# Patient Record
Sex: FEMALE | Race: WHITE | Employment: OTHER | URBAN - METROPOLITAN AREA
[De-identification: names, ages, dates, MRNs, and addresses within clinical notes are randomized per-mention and may not be internally consistent; named-entity substitution may affect disease eponyms.]

---

## 2019-12-07 ENCOUNTER — APPOINTMENT (OUTPATIENT)
Dept: GENERAL RADIOLOGY | Facility: CLINIC | Age: 69
DRG: 494 | End: 2019-12-07
Attending: NURSE PRACTITIONER
Payer: COMMERCIAL

## 2019-12-07 ENCOUNTER — TRANSFERRED RECORDS (OUTPATIENT)
Dept: HEALTH INFORMATION MANAGEMENT | Facility: CLINIC | Age: 69
End: 2019-12-07

## 2019-12-07 ENCOUNTER — HOSPITAL ENCOUNTER (INPATIENT)
Facility: CLINIC | Age: 69
LOS: 2 days | Discharge: NURSING, REHAB OR LONG-TERM CARE | DRG: 494 | End: 2019-12-11
Attending: NURSE PRACTITIONER | Admitting: INTERNAL MEDICINE
Payer: COMMERCIAL
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
## 2019-12-07 DIAGNOSIS — S82.891A CLOSED FRACTURE OF RIGHT ANKLE, INITIAL ENCOUNTER: ICD-10-CM

## 2019-12-07 PROBLEM — S82.899A ANKLE FRACTURE: Status: ACTIVE | Noted: 2019-12-07

## 2019-12-07 PROCEDURE — 99220 ZZC INITIAL OBSERVATION CARE,LEVL III: CPT | Performed by: INTERNAL MEDICINE

## 2019-12-07 PROCEDURE — 0QSJXZZ REPOSITION RIGHT FIBULA, EXTERNAL APPROACH: ICD-10-PCS | Performed by: NURSE PRACTITIONER

## 2019-12-07 PROCEDURE — 96374 THER/PROPH/DIAG INJ IV PUSH: CPT

## 2019-12-07 PROCEDURE — 40000986 XR ANKLE RT G/E 3 VW: Mod: RT

## 2019-12-07 PROCEDURE — 25000128 H RX IP 250 OP 636: Performed by: NURSE PRACTITIONER

## 2019-12-07 PROCEDURE — 27810 TREATMENT OF ANKLE FRACTURE: CPT | Mod: RT

## 2019-12-07 PROCEDURE — 0QSGXZZ REPOSITION RIGHT TIBIA, EXTERNAL APPROACH: ICD-10-PCS | Performed by: NURSE PRACTITIONER

## 2019-12-07 PROCEDURE — 25000128 H RX IP 250 OP 636

## 2019-12-07 PROCEDURE — 99285 EMERGENCY DEPT VISIT HI MDM: CPT | Mod: 25

## 2019-12-07 PROCEDURE — 25000132 ZZH RX MED GY IP 250 OP 250 PS 637: Performed by: INTERNAL MEDICINE

## 2019-12-07 PROCEDURE — G0378 HOSPITAL OBSERVATION PER HR: HCPCS

## 2019-12-07 PROCEDURE — 25000132 ZZH RX MED GY IP 250 OP 250 PS 637: Performed by: NURSE PRACTITIONER

## 2019-12-07 RX ORDER — HYDROMORPHONE HYDROCHLORIDE 1 MG/ML
INJECTION, SOLUTION INTRAMUSCULAR; INTRAVENOUS; SUBCUTANEOUS
Status: COMPLETED
Start: 2019-12-07 | End: 2019-12-07

## 2019-12-07 RX ORDER — ASPIRIN 81 MG/1
81 TABLET ORAL DAILY
COMMUNITY

## 2019-12-07 RX ORDER — POLYETHYLENE GLYCOL 3350 17 G/17G
17 POWDER, FOR SOLUTION ORAL DAILY PRN
Status: DISCONTINUED | OUTPATIENT
Start: 2019-12-07 | End: 2019-12-09

## 2019-12-07 RX ORDER — ACETAMINOPHEN 650 MG/1
650 SUPPOSITORY RECTAL EVERY 4 HOURS PRN
Status: DISCONTINUED | OUTPATIENT
Start: 2019-12-07 | End: 2019-12-09

## 2019-12-07 RX ORDER — METFORMIN HYDROCHLORIDE 750 MG/1
750 TABLET, EXTENDED RELEASE ORAL
COMMUNITY

## 2019-12-07 RX ORDER — BUPIVACAINE HYDROCHLORIDE 5 MG/ML
INJECTION, SOLUTION PERINEURAL
Status: DISCONTINUED
Start: 2019-12-07 | End: 2019-12-07 | Stop reason: HOSPADM

## 2019-12-07 RX ORDER — ACETAMINOPHEN 325 MG/1
650 TABLET ORAL EVERY 4 HOURS PRN
Status: DISCONTINUED | OUTPATIENT
Start: 2019-12-07 | End: 2019-12-09

## 2019-12-07 RX ORDER — ONDANSETRON 2 MG/ML
4 INJECTION INTRAMUSCULAR; INTRAVENOUS EVERY 6 HOURS PRN
Status: DISCONTINUED | OUTPATIENT
Start: 2019-12-07 | End: 2019-12-09

## 2019-12-07 RX ORDER — SERTRALINE HYDROCHLORIDE 100 MG/1
100 TABLET, FILM COATED ORAL DAILY
COMMUNITY

## 2019-12-07 RX ORDER — IBUPROFEN 600 MG/1
600 TABLET, FILM COATED ORAL ONCE
Status: COMPLETED | OUTPATIENT
Start: 2019-12-07 | End: 2019-12-07

## 2019-12-07 RX ORDER — HYDROMORPHONE HYDROCHLORIDE 1 MG/ML
0.3 INJECTION, SOLUTION INTRAMUSCULAR; INTRAVENOUS; SUBCUTANEOUS
Status: DISCONTINUED | OUTPATIENT
Start: 2019-12-07 | End: 2019-12-09

## 2019-12-07 RX ORDER — METOPROLOL SUCCINATE 25 MG/1
25 TABLET, EXTENDED RELEASE ORAL DAILY
COMMUNITY

## 2019-12-07 RX ORDER — ONDANSETRON 4 MG/1
4 TABLET, ORALLY DISINTEGRATING ORAL EVERY 6 HOURS PRN
Status: DISCONTINUED | OUTPATIENT
Start: 2019-12-07 | End: 2019-12-09

## 2019-12-07 RX ORDER — LISINOPRIL 5 MG/1
5 TABLET ORAL EVERY EVENING
COMMUNITY

## 2019-12-07 RX ORDER — OXYCODONE HYDROCHLORIDE 5 MG/1
5-10 TABLET ORAL
Status: DISCONTINUED | OUTPATIENT
Start: 2019-12-07 | End: 2019-12-09

## 2019-12-07 RX ORDER — HYDROMORPHONE HYDROCHLORIDE 1 MG/ML
0.5 INJECTION, SOLUTION INTRAMUSCULAR; INTRAVENOUS; SUBCUTANEOUS ONCE
Status: COMPLETED | OUTPATIENT
Start: 2019-12-07 | End: 2019-12-07

## 2019-12-07 RX ORDER — NALOXONE HYDROCHLORIDE 0.4 MG/ML
.1-.4 INJECTION, SOLUTION INTRAMUSCULAR; INTRAVENOUS; SUBCUTANEOUS
Status: DISCONTINUED | OUTPATIENT
Start: 2019-12-07 | End: 2019-12-09

## 2019-12-07 RX ORDER — GLIMEPIRIDE 1 MG/1
1 TABLET ORAL EVERY EVENING
COMMUNITY

## 2019-12-07 RX ORDER — ROSUVASTATIN CALCIUM 40 MG/1
40 TABLET, COATED ORAL AT BEDTIME
COMMUNITY

## 2019-12-07 RX ORDER — LIDOCAINE HYDROCHLORIDE AND EPINEPHRINE 10; 10 MG/ML; UG/ML
INJECTION, SOLUTION INFILTRATION; PERINEURAL
Status: DISCONTINUED
Start: 2019-12-07 | End: 2019-12-07 | Stop reason: HOSPADM

## 2019-12-07 RX ORDER — HYDROCODONE BITARTRATE AND ACETAMINOPHEN 5; 325 MG/1; MG/1
1 TABLET ORAL ONCE
Status: COMPLETED | OUTPATIENT
Start: 2019-12-07 | End: 2019-12-07

## 2019-12-07 RX ORDER — HYDROCODONE BITARTRATE AND ACETAMINOPHEN 5; 325 MG/1; MG/1
1 TABLET ORAL EVERY 6 HOURS PRN
Qty: 15 TABLET | Refills: 0 | Status: SHIPPED | OUTPATIENT
Start: 2019-12-07 | End: 2019-12-10

## 2019-12-07 RX ORDER — DOCUSATE SODIUM 100 MG/1
100 CAPSULE, LIQUID FILLED ORAL 2 TIMES DAILY
Status: DISCONTINUED | OUTPATIENT
Start: 2019-12-07 | End: 2019-12-09

## 2019-12-07 RX ADMIN — HYDROMORPHONE HYDROCHLORIDE 0.5 MG: 1 INJECTION, SOLUTION INTRAMUSCULAR; INTRAVENOUS; SUBCUTANEOUS at 18:58

## 2019-12-07 RX ADMIN — IBUPROFEN 600 MG: 600 TABLET, FILM COATED ORAL at 22:49

## 2019-12-07 RX ADMIN — DOCUSATE SODIUM 100 MG: 100 CAPSULE, LIQUID FILLED ORAL at 22:49

## 2019-12-07 RX ADMIN — HYDROCODONE BITARTRATE AND ACETAMINOPHEN 1 TABLET: 5; 325 TABLET ORAL at 22:49

## 2019-12-07 RX ADMIN — HYDROMORPHONE HYDROCHLORIDE 0.5 MG: 1 INJECTION, SOLUTION INTRAMUSCULAR; INTRAVENOUS; SUBCUTANEOUS at 17:59

## 2019-12-07 ASSESSMENT — ENCOUNTER SYMPTOMS
DIZZINESS: 0
COLOR CHANGE: 0
WOUND: 0
HEADACHES: 0
NECK PAIN: 0
SHORTNESS OF BREATH: 0
ARTHRALGIAS: 1
BACK PAIN: 0

## 2019-12-07 NOTE — ED TRIAGE NOTES
Patient presents with right ankle after falling on the ice around 1600. Patient went to [facility], had X-Rays and then was sent here.

## 2019-12-08 ENCOUNTER — ANESTHESIA EVENT (OUTPATIENT)
Dept: SURGERY | Facility: CLINIC | Age: 69
DRG: 494 | End: 2019-12-08
Payer: COMMERCIAL

## 2019-12-08 ENCOUNTER — APPOINTMENT (OUTPATIENT)
Dept: PHYSICAL THERAPY | Facility: CLINIC | Age: 69
DRG: 494 | End: 2019-12-08
Attending: INTERNAL MEDICINE
Payer: COMMERCIAL

## 2019-12-08 LAB
ANION GAP SERPL CALCULATED.3IONS-SCNC: 6 MMOL/L (ref 3–14)
BASOPHILS # BLD AUTO: 0 10E9/L (ref 0–0.2)
BASOPHILS NFR BLD AUTO: 0.3 %
BUN SERPL-MCNC: 21 MG/DL (ref 7–30)
CALCIUM SERPL-MCNC: 8.5 MG/DL (ref 8.5–10.1)
CHLORIDE SERPL-SCNC: 108 MMOL/L (ref 94–109)
CO2 SERPL-SCNC: 26 MMOL/L (ref 20–32)
CREAT SERPL-MCNC: 0.78 MG/DL (ref 0.52–1.04)
DIFFERENTIAL METHOD BLD: ABNORMAL
EOSINOPHIL # BLD AUTO: 0.1 10E9/L (ref 0–0.7)
EOSINOPHIL NFR BLD AUTO: 1 %
ERYTHROCYTE [DISTWIDTH] IN BLOOD BY AUTOMATED COUNT: 13.8 % (ref 10–15)
GFR SERPL CREATININE-BSD FRML MDRD: 76 ML/MIN/{1.73_M2}
GLUCOSE BLDC GLUCOMTR-MCNC: 130 MG/DL (ref 70–99)
GLUCOSE BLDC GLUCOMTR-MCNC: 165 MG/DL (ref 70–99)
GLUCOSE SERPL-MCNC: 132 MG/DL (ref 70–99)
HCT VFR BLD AUTO: 33.8 % (ref 35–47)
HGB BLD-MCNC: 10.9 G/DL (ref 11.7–15.7)
IMM GRANULOCYTES # BLD: 0 10E9/L (ref 0–0.4)
IMM GRANULOCYTES NFR BLD: 0.2 %
INR PPP: 0.99 (ref 0.86–1.14)
LYMPHOCYTES # BLD AUTO: 1.3 10E9/L (ref 0.8–5.3)
LYMPHOCYTES NFR BLD AUTO: 21.6 %
MCH RBC QN AUTO: 28.9 PG (ref 26.5–33)
MCHC RBC AUTO-ENTMCNC: 32.2 G/DL (ref 31.5–36.5)
MCV RBC AUTO: 90 FL (ref 78–100)
MONOCYTES # BLD AUTO: 0.6 10E9/L (ref 0–1.3)
MONOCYTES NFR BLD AUTO: 9.2 %
NEUTROPHILS # BLD AUTO: 4.1 10E9/L (ref 1.6–8.3)
NEUTROPHILS NFR BLD AUTO: 67.7 %
NRBC # BLD AUTO: 0 10*3/UL
NRBC BLD AUTO-RTO: 0 /100
PLATELET # BLD AUTO: 194 10E9/L (ref 150–450)
POTASSIUM SERPL-SCNC: 4.1 MMOL/L (ref 3.4–5.3)
RBC # BLD AUTO: 3.77 10E12/L (ref 3.8–5.2)
SODIUM SERPL-SCNC: 140 MMOL/L (ref 133–144)
WBC # BLD AUTO: 6.1 10E9/L (ref 4–11)

## 2019-12-08 PROCEDURE — 80048 BASIC METABOLIC PNL TOTAL CA: CPT | Performed by: INTERNAL MEDICINE

## 2019-12-08 PROCEDURE — 83036 HEMOGLOBIN GLYCOSYLATED A1C: CPT | Performed by: INTERNAL MEDICINE

## 2019-12-08 PROCEDURE — 25000128 H RX IP 250 OP 636: Performed by: INTERNAL MEDICINE

## 2019-12-08 PROCEDURE — 85610 PROTHROMBIN TIME: CPT | Performed by: INTERNAL MEDICINE

## 2019-12-08 PROCEDURE — 97530 THERAPEUTIC ACTIVITIES: CPT | Mod: GP

## 2019-12-08 PROCEDURE — 00000146 ZZHCL STATISTIC GLUCOSE BY METER IP

## 2019-12-08 PROCEDURE — 96376 TX/PRO/DX INJ SAME DRUG ADON: CPT

## 2019-12-08 PROCEDURE — 97161 PT EVAL LOW COMPLEX 20 MIN: CPT | Mod: GP

## 2019-12-08 PROCEDURE — 93010 ELECTROCARDIOGRAM REPORT: CPT | Performed by: INTERNAL MEDICINE

## 2019-12-08 PROCEDURE — 36415 COLL VENOUS BLD VENIPUNCTURE: CPT | Performed by: INTERNAL MEDICINE

## 2019-12-08 PROCEDURE — G0378 HOSPITAL OBSERVATION PER HR: HCPCS

## 2019-12-08 PROCEDURE — 99225 ZZC SUBSEQUENT OBSERVATION CARE,LEVEL II: CPT | Performed by: PHYSICIAN ASSISTANT

## 2019-12-08 PROCEDURE — 25000132 ZZH RX MED GY IP 250 OP 250 PS 637: Performed by: PHYSICIAN ASSISTANT

## 2019-12-08 PROCEDURE — 93005 ELECTROCARDIOGRAM TRACING: CPT

## 2019-12-08 PROCEDURE — 85025 COMPLETE CBC W/AUTO DIFF WBC: CPT | Performed by: INTERNAL MEDICINE

## 2019-12-08 PROCEDURE — 25000132 ZZH RX MED GY IP 250 OP 250 PS 637: Performed by: INTERNAL MEDICINE

## 2019-12-08 RX ORDER — METOPROLOL SUCCINATE 25 MG/1
25 TABLET, EXTENDED RELEASE ORAL DAILY
Status: DISCONTINUED | OUTPATIENT
Start: 2019-12-08 | End: 2019-12-11 | Stop reason: HOSPADM

## 2019-12-08 RX ORDER — NICOTINE POLACRILEX 4 MG
15-30 LOZENGE BUCCAL
Status: DISCONTINUED | OUTPATIENT
Start: 2019-12-08 | End: 2019-12-09

## 2019-12-08 RX ORDER — SERTRALINE HYDROCHLORIDE 100 MG/1
100 TABLET, FILM COATED ORAL DAILY
Status: DISCONTINUED | OUTPATIENT
Start: 2019-12-08 | End: 2019-12-11 | Stop reason: HOSPADM

## 2019-12-08 RX ORDER — LISINOPRIL 5 MG/1
5 TABLET ORAL EVERY EVENING
Status: DISCONTINUED | OUTPATIENT
Start: 2019-12-08 | End: 2019-12-11 | Stop reason: HOSPADM

## 2019-12-08 RX ORDER — DEXTROSE MONOHYDRATE 25 G/50ML
25-50 INJECTION, SOLUTION INTRAVENOUS
Status: DISCONTINUED | OUTPATIENT
Start: 2019-12-08 | End: 2019-12-09

## 2019-12-08 RX ADMIN — OXYCODONE HYDROCHLORIDE 10 MG: 5 TABLET ORAL at 15:35

## 2019-12-08 RX ADMIN — HYDROMORPHONE HYDROCHLORIDE 0.3 MG: 1 INJECTION, SOLUTION INTRAMUSCULAR; INTRAVENOUS; SUBCUTANEOUS at 16:31

## 2019-12-08 RX ADMIN — OXYCODONE HYDROCHLORIDE 10 MG: 5 TABLET ORAL at 20:17

## 2019-12-08 RX ADMIN — DOCUSATE SODIUM 100 MG: 100 CAPSULE, LIQUID FILLED ORAL at 08:08

## 2019-12-08 RX ADMIN — METOPROLOL SUCCINATE 25 MG: 25 TABLET, EXTENDED RELEASE ORAL at 09:03

## 2019-12-08 RX ADMIN — HYDROMORPHONE HYDROCHLORIDE 0.3 MG: 1 INJECTION, SOLUTION INTRAMUSCULAR; INTRAVENOUS; SUBCUTANEOUS at 19:07

## 2019-12-08 RX ADMIN — OXYCODONE HYDROCHLORIDE 5 MG: 5 TABLET ORAL at 11:28

## 2019-12-08 RX ADMIN — OXYCODONE HYDROCHLORIDE 5 MG: 5 TABLET ORAL at 08:07

## 2019-12-08 RX ADMIN — DOCUSATE SODIUM 100 MG: 100 CAPSULE, LIQUID FILLED ORAL at 20:17

## 2019-12-08 RX ADMIN — LISINOPRIL 5 MG: 5 TABLET ORAL at 20:17

## 2019-12-08 RX ADMIN — SERTRALINE HYDROCHLORIDE 100 MG: 100 TABLET ORAL at 09:03

## 2019-12-08 NOTE — CONSULTS
Consult Date:  2019      ORTHOPEDIC CONSULTATION       CHIEF COMPLAINT:  Right ankle pain.      HISTORY OF PRESENT ILLNESS:  This 69-year-old woman fell last night sustaining injury to her right ankle.  Evaluation in the emergency room reveals a displaced bimalleolar fracture.  The patient is admitted for definitive management.      PHYSICAL EXAMINATION:  The patient is alert and oriented in bed.  Her only complaint is her right ankle.  Any movement of the ankle causes her pain.  Neurovascular status to her toes is intact.      IMAGING:  X-rays showed displaced bimalleolar fracture.      ASSESSMENT:  Displaced bimalleolar right ankle fracture.      PLAN:  I discussed risks, options and alternatives of management with the patient.  Clearly she needs open reduction internal fixation.  We talked about how this would occur, the risks, expected results and rehabilitation required.  She understands and agrees to proceed.  Surgery will be scheduled first thing tomorrow morning.         MARY BHAT MD             D: 2019   T: 2019   MT: WT      Name:     TRICE LELIS   MRN:      [id number]        Account:       [id number]   :      1950           Consult Date:  2019      Document: [id number]       cc: Mary Bhat MD

## 2019-12-08 NOTE — PLAN OF CARE
PRIMARY DIAGNOSIS: ACUTE PAIN - right ankle fx  OUTPATIENT/OBSERVATION GOALS TO BE MET BEFORE DISCHARGE:  1. Pain Status: Pt. Reports her pain is 10/10 with movement, oxycodone 5 mg given, pt. Reports she is still 10/10 after oxycodone, refused further intervention.  Pt. Icing and elevating    2. Return to near baseline physical activity: No, Pt. Unable to move with crutches in ED, PT consult    3. Cleared for discharge by consultants (if involved): No    Per night shift RN, pt. Has child she may be able to live with to help her.  Waiting for orthopedic consult.  Right CMS intact    Discharge Planner Nurse   Safe discharge environment identified: Yes  Barriers to discharge: No       Entered by: Diogenes Boland 12/08/2019 9:29 AM     Please review provider order for any additional goals.   Nurse to notify provider when observation goals have been met and patient is ready for discharge.

## 2019-12-08 NOTE — PLAN OF CARE
Acute Traumatic Pain/ Right Ankle Fracture     1.  Pain controlled with oral analgesia: Yes      2.  Vital signs stable: Yes      3.  Diagnotic testing complete: Yes      4.  Cleared from consultants (if applicable): No      5. Return to near baseline physical activity: No    Vitals are Temp: 96.6  F (35.9  C) Temp src: Oral BP: (!) 152/78 Pulse: 68 Heart Rate: 97 Resp: 18 SpO2: 97 %.  Patient is Alert and Oriented x4. They are SBA, non weight bearing, right ankle in splint and ace wrap with Walker.  Pt is a NPO diet.  They are showing nonverbal signs of pain, resting comfortably.  Patient is Saline locked. Awaiting ortho consult to determine surgical intervention.

## 2019-12-08 NOTE — PLAN OF CARE
ROOM # 213-2    Living Situation (if not independent, order SW consult):  Facility name:  : [facility]    Activity level at baseline: independent  Activity level on admit: Assist x1 with crutches      Patient registered to observation; given Patient Bill of Rights; given the opportunity to ask questions about observation status and their plan of care.  Patient has been oriented to the observation room, bathroom and call light is in place.    Discussed discharge goals and expectations with patient/family.

## 2019-12-08 NOTE — PROGRESS NOTES
[facility]    Observation Unit  Hospitalist Progress Note  Name: Tegan Amador    MRN: [id number]  Provider:  Aida Fields PA-C  Date of Service: 12/08/2019    Assessment & Plan   Summary of Stay: Tegan Amador is a 69 year old female with PMH significant for CAD s/p ELDON to proximal LAD (08/2014), HTN, HLD, depression, DM2, and prior tobacco abuse who was admitted on 12/7/2019 after a mechanical fall resulting in a right ankle fracture.     #Right ankle fracture s/p reduction in ED  #S/p mechanical fall: patient slipped on ice and fell the evening of 12/7/19 where she twisted her ankle inwards. No LOC and did not hit her head. X-ray of right ankle showed displaced fracture of the medial malleolus and distal fibular and a small bone fragment adjacent to the posterior malleolus. Unable to discharge patient from ED due to inability to mobilize with crutches.   - Orthopedics consulted, aware of patient and will see later today with plans for OR in AM   - NPO at midnight  - Nonweight bearing with right LE  - Pain control with PRN Tylenol, Oxycodone, and IV Dilaudid available for severe pain    - PT consulted and recommend TCU at this time, will request reassessing after surgery   - Obtain preoperative EKG    #DM2: no recent HgbA1c, blood glucose in the 130s since admission with hold home regimen   - Continue to hold Metformin and Glimepiride   - Add on HgbA1c  - Blood glucose checks BID and every 4 hours once NPO    #CAD, HTN, HLD: s/p ELDON to proximal LAD in 08/2014, previously followed with Dr. Richardson of cardiology at [facility]. No current chest pain or shortness of breath. Blood pressure intermittently elevated, likely 2/2 increased pain.   - Continue PTA Lisinopril and Metoprolol  - Resume Atorvastatin at discharge   - Hold ASA due to plans for surgery     #Anemia: no prior history with slight decrease in Hgb at 10.9 today, possibly related to acute fracture.   - Recheck Hgb in AM      #Depression:  continue PTA Sertraline      DVT Prophylaxis: PCD on left LE  Code Status: Full Code  Disposition: Expected discharge in 1-2 days pending timing of surgical intervention and ability to mobilize with PT after    Interval History   Patient reports pain is up to a 10 when up out of bed. Denies numbness or tingling, nausea, vomiting, chest pain, shortness of breath, or abdominal pain.    -Data reviewed today: I reviewed all new labs and imaging reports over the last 24 hours.    Physical Exam   Temp: 97.4  F (36.3  C) Temp src: Oral BP: (!) 155/79 Pulse: 63 Heart Rate: 62 Resp: 16 SpO2: 96 % O2 Device: None (Room air)    Vitals:    12/07/19 2225   Weight: 87.7 kg (193 lb 6.4 oz)     Vital Signs with Ranges  Temp:  [96.5  F (35.8  C)-97.7  F (36.5  C)] 97.4  F (36.3  C)  Pulse:  [63-72] 63  Heart Rate:  [62-97] 62  Resp:  [16-24] 16  BP: (125-155)/(70-80) 155/79  SpO2:  [93 %-97 %] 96 %  No intake/output data recorded.    GEN:  Alert, oriented x 3, appears uncomfortable when moving ankle in bed, NAD.  HEENT:  Normocephalic/atraumatic, no scleral icterus, no nasal discharge, mouth moist.  CV:  Regular rate and rhythm, no murmur or JVD.  S1 + S2 noted, no S3 or S4.  LUNGS:  Clear to auscultation bilaterally without rales/rhonchi/wheezing/retractions.  Symmetric chest rise on inhalation noted.  ABD:  Active bowel sounds, soft, non-tender/non-distended.  No rebound/guarding/rigidity.  EXT: Right ankle in ACE wrap with splint in place, able to wiggle toes, right ankle and toes warm to touch. No cyanosis.  No acute joint synovitis noted.  SKIN:  Dry to touch, no exanthems noted in the visualized areas.    Medications       docusate sodium  100 mg Oral BID     lisinopril  5 mg Oral QPM     metoprolol succinate ER  25 mg Oral Daily     sertraline  100 mg Oral Daily     Data   Results for orders placed or performed during the hospital encounter of 12/07/19   Basic metabolic panel     Status: Abnormal   Result Value Ref Range     Sodium 140 133 - 144 mmol/L    Potassium 4.1 3.4 - 5.3 mmol/L    Chloride 108 94 - 109 mmol/L    Carbon Dioxide 26 20 - 32 mmol/L    Anion Gap 6 3 - 14 mmol/L    Glucose 132 (H) 70 - 99 mg/dL    Urea Nitrogen 21 7 - 30 mg/dL    Creatinine 0.78 0.52 - 1.04 mg/dL    GFR Estimate 76 >60 mL/min/[1.73_m2]    GFR Estimate If Black 88 >60 mL/min/[1.73_m2]    Calcium 8.5 8.5 - 10.1 mg/dL   CBC with platelets differential     Status: Abnormal   Result Value Ref Range    WBC 6.1 4.0 - 11.0 10e9/L    RBC Count 3.77 (L) 3.8 - 5.2 10e12/L    Hemoglobin 10.9 (L) 11.7 - 15.7 g/dL    Hematocrit 33.8 (L) 35.0 - 47.0 %    MCV 90 78 - 100 fl    MCH 28.9 26.5 - 33.0 pg    MCHC 32.2 31.5 - 36.5 g/dL    RDW 13.8 10.0 - 15.0 %    Platelet Count 194 150 - 450 10e9/L    Diff Method Automated Method     % Neutrophils 67.7 %    % Lymphocytes 21.6 %    % Monocytes 9.2 %    % Eosinophils 1.0 %    % Basophils 0.3 %    % Immature Granulocytes 0.2 %    Nucleated RBCs 0 0 /100    Absolute Neutrophil 4.1 1.6 - 8.3 10e9/L    Absolute Lymphocytes 1.3 0.8 - 5.3 10e9/L    Absolute Monocytes 0.6 0.0 - 1.3 10e9/L    Absolute Eosinophils 0.1 0.0 - 0.7 10e9/L    Absolute Basophils 0.0 0.0 - 0.2 10e9/L    Abs Immature Granulocytes 0.0 0 - 0.4 10e9/L    Absolute Nucleated RBC 0.0    INR     Status: None   Result Value Ref Range    INR 0.99 0.86 - 1.14   Glucose by meter     Status: Abnormal   Result Value Ref Range    Glucose 130 (H) 70 - 99 mg/dL     Aida Fields PA-C

## 2019-12-08 NOTE — PROGRESS NOTES
12/08/19 1107   Quick Adds   Type of Visit Initial PT Evaluation   Living Environment   Lives With alone   Living Arrangements house   Home Accessibility stairs to enter home;stairs within home   Number of Stairs, Main Entrance 1   Living Environment Comment Patient lives in [facility].  Needs to do 15 steps to access bed/bath/kitchen from garage   Self-Care   Usual Activity Tolerance moderate   Current Activity Tolerance poor  (secondary to pain)   Regular Exercise No   Equipment Currently Used at Home cane, straight   Activity/Exercise/Self-Care Comment Is retired   Functional Level Prior   Ambulation 0-->independent   Transferring 0-->independent   Toileting 0-->independent   Bathing 0-->independent   Communication 0-->understands/communicates without difficulty   Swallowing 0-->swallows foods/liquids without difficulty   Cognition 0 - no cognition issues reported   Fall history within last six months yes   Number of times patient has fallen within last six months 1   Which of the above functional risks had a recent onset or change? ambulation;transferring;fall history   Prior Functional Level Comment Patient independent prior to injury   General Information   Onset of Illness/Injury or Date of Surgery - Date 12/07/19   Referring Physician Yuosuf Chan MD   Patient/Family Goals Statement decrease pain   Pertinent History of Current Problem (include personal factors and/or comorbidities that impact the POC) Patient with PMH including CAD with previous cardiac stent 4 years ago, DM, hyperlipidemia, hx of MI.  Patient admitted with ankle fracture following mechanical fall.    Precautions/Limitations fall precautions   Weight-Bearing Status - LLE nonweight-bearing   Cognitive Status Examination   Orientation orientation to person, place and time   Pain Assessment   Patient Currently in Pain Yes, see Vital Sign flowsheet   Integumentary/Edema   Integumentary/Edema Comments edema present at left  "LE   Posture    Posture Forward head position;Protracted shoulders   Range of Motion (ROM)   ROM Comment WFL- did not assess left LE   Strength   Strength Comments Patient with decreased UE strength, decreased ability to bear body weight through UEs   Bed Mobility   Bed Mobility Comments min A   Transfer Skills   Transfer Comments sit<>standing with 2WW and min A   Gait   Gait Comments unable to ambulate at this time   Balance   Balance Comments high fall risk   General Therapy Interventions   Planned Therapy Interventions balance training;bed mobility training;gait training;transfer training;home program guidelines;progressive activity/exercise   Clinical Impression   Criteria for Skilled Therapeutic Intervention yes, treatment indicated   PT Diagnosis decreased independence with mobility   Influenced by the following impairments pain, NWB   Functional limitations due to impairments difficulties with gait   Clinical Presentation Stable/Uncomplicated   Clinical Presentation Rationale moderately complex pmh, stable presentation, fair social support   Clinical Decision Making (Complexity) Low complexity   Therapy Frequency Daily   Predicted Duration of Therapy Intervention (days/wks) 5 days   Anticipated Discharge Disposition Transitional Care Facility   Risk & Benefits of therapy have been explained Yes   Patient, Family & other staff in agreement with plan of care Yes   [facility] [employer] TM \"6 Clicks\"   2016, Trustees of [facility], under license to [employer].  All rights reserved.   6 Clicks Short Forms Basic Mobility Inpatient Short Form   [facility] [employer]  \"6 Clicks\" V.2 Basic Mobility Inpatient Short Form   1. Turning from your back to your side while in a flat bed without using bedrails? 3 - A Little   2. Moving from lying on your back to sitting on the side of a flat bed without using bedrails? 3 - A Little   3. Moving to and from a bed to a chair (including a wheelchair)? 2 - A Lot "   4. Standing up from a chair using your arms (e.g., wheelchair, or bedside chair)? 3 - A Little   5. To walk in hospital room? 1 - Total   6. Climbing 3-5 steps with a railing? 1 - Total   Basic Mobility Raw Score (Score out of 24.Lower scores equate to lower levels of function) 13   Total Evaluation Time   Total Evaluation Time (Minutes) 5

## 2019-12-08 NOTE — ED PROVIDER NOTES
History     Chief Complaint:  Ankle Pain    HPI   Tegan Amador is a 69 year old female who presents to the emergency department today with ankle pain. The patient slipped and fell on ice about 2 hours ago, twisting her right ankle inwards. She did not hit her head and denies loss of consciousness. This was a mechanical fall. The patient was seen at [facility] and they took imaging. Disc sent with patient, images loaded into system, consistent with bimalleolar fracture. They sent her to the ER for continued care due to the imagery findings.     Allergies:  No Known Drug Allergies     Medications:    Zoloft   Ecotrin  Crestor  Prinivil  Nitrostat  Metoprolol  Glucophage  Nitrostat  Diflucan    Past Medical History:    NSTEMI  CAD  Hyperlipidemia  Hypertension  Nicotine use disorder    Past Surgical History:    Coronary stent placement     Family History:    History reviewed. No pertinent family history.      Social History:  The patient was accompanied to the ED by sister.  Smoking Status: Former  Smokeless Tobacco: Never  Alcohol Use: No   Marital Status:  Single    Review of Systems   Eyes: Negative for visual disturbance.   Respiratory: Negative for shortness of breath.    Cardiovascular: Negative for chest pain.   Musculoskeletal: Positive for arthralgias (right ankle pain). Negative for back pain and neck pain.   Skin: Negative for color change and wound.   Neurological: Negative for dizziness and headaches.   All other systems reviewed and are negative.      Physical Exam     Patient Vitals for the past 24 hrs:   BP Temp Temp src Heart Rate Resp SpO2   12/07/19 2015 (!) 149/74 -- -- -- 18 94 %   12/07/19 1749 (!) 146/80 97.7  F (36.5  C) Oral 97 24 95 %     Physical Exam  General: Alert, Moderate discomfort, well kept, obese  HENT:  Normal voice, No lymphadenopathy  Eyes:  The pupils are equal, round, and reactive to light, Conjunctiva normal, No scleral icterus   Neck:  Normal range of  motion  CV:  Normal Pulses  Resp:  Non-labored, No cough  MS:  Normal muscular tone, moves all extremities, Right anklewith tenderness, swellling, no obvious deformity and good distal circulation and sensation  Skin:  No rash or acute skin lesions noted  Neuro: Speech is normal and fluent  Psych:  Awake. Alert.  Normal affect.  Appropriate interactions. Good eye contact  Emergency Department Course    Imaging:  Radiology findings were communicated with the patient and family who voiced understanding of the findings.  Ankle XR, G/E 3 views, right   Final Result   IMPRESSION: Displaced fracture of the medial malleolus and distal fibula with small bone fragment adjacent to the posterior malleolus. Increased lateral subluxation of the talus in relation to the distal tibia with increased ankle mortise asymmetry.    Improved alignment on the lateral view. Splint material.      Report per radiology       Procedures:   Procedure Note: Reduction of Fractured Right Ankle  Physician: Abdiel Kim under the direction of Dr. Corona  Diagnosis: Bimalleolar fracture  Consent: Informed written, see paper chart  Medication: 1% lidocaine with epinephrine and 0.5% bupivocaine 10 cc   Description of Procedure: Consent as above.  Patient positioned in supine position.  Procedural anesthesia was utilized, see above note.  Gentle traction and lateral to medial pressure was used to hold ankle and stabilized alignment.   The neurovascular exam was normal before and after reduction attempt.  The patient tolerated the procedure well, there were no complications.      Procedure Note: Splint Placement  Physician: Abdiel Kim under the direction of Dr. Corona  Diagnosis: Bimalleolar fracture  Consent: Informed written, see paper chart  Description of Procedure:  Following reduction of the ankle fracture, the leg was placed in a posterior and stirrup fiberglass splint.  The ankle was held as above in what appeared to be generally normalized  alignment.  The neurovascular exam was normal before and after splint placement.  The patient tolerated the procedure well, there were no complications.        Interventions:  1759: Dilaudid 0.5mg IV  1858: Dilaudid 0.5mg IV    Emergency Department Course:  Nursing notes and vitals reviewed.  1800: I performed an exam of the patient as documented above.   The patient was sent for an Ankle XR while in the emergency department, results above.   2121: Findings and plan explained to the Patient who consents to admission. Discussed the patient with Dr. Chan, who will admit the patient to an Observation bed for further monitoring, evaluation, and treatment.   I personally reviewed the imaging results with the Patient and answered all related questions prior to admission.      Impression & Plan    Medical Decision Making:  Tegan Amador is a 69 year old female who presents today for evaluation of right ankle injury and pain after fall.  She was initially seen at [facility] imaging was taken and referred here for further evaluation.  Her imaging did show a bimalleolar fracture with mortise disruption.  There was no posterior displacement.  She was given the above medication and gentle realignment with splint placement was not done as above.  Patient did really want to try to go home however she could not manage crutches therefore plan will be to admit her to the observation unit.  Most likely will receive Ortho consult and pain management.  I spoke to hospitalist who did accept patient to their service.    Diagnosis:    ICD-10-CM    1. Fall, initial encounter W19.XXXA    2. Closed fracture of right ankle, initial encounter S82.891A        Disposition:  Admitted to Observation    Discharge Medications:  New Prescriptions    HYDROCODONE-ACETAMINOPHEN (NORCO) 5-325 MG TABLET    Take 1 tablet by mouth every 6 hours as needed for severe pain     Scribe Disclosure:  ICharlene MD, am serving as a  scribe at 6:18 PM on 12/7/2019 to document services personally performed by Abdiel Kim APRN based on my observations and the provider's statements to me.    12/7/2019   [facility] EMERGENCY DEPARTMENT       Abdiel Kim APRN CNP  12/07/19 2236

## 2019-12-08 NOTE — PLAN OF CARE
Acute Traumatic Pain/ Right Ankle Fracture     1.  Pain controlled with oral analgesia: Yes      2.  Vital signs stable: Yes      3.  Diagnotic testing complete: Yes      4.  Cleared from consultants (if applicable): No      5. Return to near baseline physical activity: No    Vitals are Temp: 96.9  F (36.1  C) Temp src: Oral BP: 125/70 Pulse: 68 Heart Rate: 62 Resp: 18 SpO2: 93 %.  Patient is Alert and Oriented x4. They are SBA, non weight bearing, right ankle in splint and ace wrap with Walker.  Pt is a NPO diet.  They are showing nonverbal signs of pain, resting comfortably.  Patient is Saline locked. Awaiting ortho consult to determine surgical intervention.

## 2019-12-08 NOTE — PHARMACY-ADMISSION MEDICATION HISTORY
Admission medication history interview status for this patient is complete. See [facility] admission navigator for allergy information, prior to admission medications and immunization status.     Medication history interview source(s):Patient  Medication history resources (including written lists, pill bottles, clinic record):care everywhere  Primary pharmacy:[facility]    Changes made to PTA medication list:  Added: all meds  Deleted: -  Changed: -    Actions taken by pharmacist (provider contacted, etc):None     Additional medication history information:None    Medication reconciliation/reorder completed by provider prior to medication history?  Yes     Do you take OTC medications (eg tylenol, ibuprofen, fish oil, eye/ear drops, etc)? Yes     For patients on insulin therapy: No      Prior to Admission medications    Medication Sig Last Dose Taking? Auth Provider   aspirin 81 MG EC tablet Take 81 mg by mouth daily 12/7/2019 at am Yes Unknown, Entered By History   glimepiride (AMARYL) 1 MG tablet Take 1 mg by mouth every evening 12/6/2019 at pm Yes Unknown, Entered By History   HYDROcodone-acetaminophen (NORCO) 5-325 MG tablet Take 1 tablet by mouth every 6 hours as needed for severe pain  Yes Abdiel Kim, APRN CNP   lisinopril (PRINIVIL/ZESTRIL) 5 MG tablet Take 5 mg by mouth every evening 12/6/2019 at pm Yes Unknown, Entered By History   metFORMIN (GLUCOPHAGE-XR) 750 MG 24 hr tablet Take 750 mg by mouth daily (with breakfast) 12/7/2019 at am Yes Unknown, Entered By History   metoprolol succinate ER (TOPROL-XL) 25 MG 24 hr tablet Take 25 mg by mouth daily 12/7/2019 at am Yes Unknown, Entered By History   rosuvastatin (CRESTOR) 40 MG tablet Take 40 mg by mouth At Bedtime 12/6/2019 at hssertral Yes Unknown, Entered By History   sertraline (ZOLOFT) 100 MG tablet Take 100 mg by mouth daily 12/7/2019 at am Yes Unknown, Entered By History

## 2019-12-08 NOTE — ED NOTES
[facility]  ED Nurse Handoff Report    Tegan Amador is a 69 year old female   ED Chief complaint: Ankle Pain  . ED Diagnosis:   Final diagnoses:   Fall, initial encounter   Closed fracture of right ankle, initial encounter     Allergies: No Known Allergies    Code Status: Full Code  Activity level - Baseline/Home:  Independent. Activity Level - Current:   Assist X 1. Lift room needed: No. Bariatric: No   Needed: No   Isolation: No. Infection: Not Applicable.     Vital Signs:   Vitals:    12/07/19 1749 12/07/19 2015   BP: (!) 146/80 (!) 149/74   Resp: 24 18   Temp: 97.7  F (36.5  C)    TempSrc: Oral    SpO2: 95% 94%       Cardiac Rhythm:  ,      Pain level: 0-10 Pain Scale: 5  Patient confused: No. Patient Falls Risk: Yes.   Elimination Status: Has voided   Patient Report - Initial Complaint: Right ankle injury. Focused Assessment: See Physician Note   Tests Performed: Xrays, Labs. Abnormal Results: See RESULTS.   Treatments provided: Medication, Splint, cruthches  Family Comments: Present agreeable to admit/OBS  OBS brochure/video discussed/provided to patient:  Yes  ED Medications:   Medications   lidocaine 1% with EPINEPHrine 1:100,000 1 %-1:104951 injection (has no administration in time range)   bupivacaine (MARCAINE) 0.5 % injection (has no administration in time range)   HYDROmorphone (PF) (DILAUDID) 0.5 MG/0.5 ML injection (0.5 mg  Given 12/7/19 6380)   HYDROmorphone (PF) (DILAUDID) injection 0.5 mg (0.5 mg Intravenous Given 12/7/19 0682)     Drips infusing:  No  For the majority of the shift, the patient's behavior Green. Interventions performed were None.     Severe Sepsis OR Septic Shock Diagnosis Present: No      ED Nurse Name/Phone Number: Myesha Johnson RN,   9:01 PM    RECEIVING UNIT ED HANDOFF REVIEW    Above ED Nurse Handoff Report was reviewed: Yes  Reviewed by: Diogenes Dos Santos RN on December 7, 2019 at 9:38 PM   Did you vocera the ED RN: yes

## 2019-12-08 NOTE — PLAN OF CARE
PRIMARY DIAGNOSIS: ACUTE PAIN - right ankle fx  OUTPATIENT/OBSERVATION GOALS TO BE MET BEFORE DISCHARGE:    1. Pain Status: Pt. Reports her pain is 10/10 with movement, oxycodone 5 mg given, pt. Reports she is still 10/10 after oxycodone, continues to refuse further intervention.  Pt. Icing and elevating     2. Return to near baseline physical activity: No, Pt. Unable to move with crutches in ED, PT recommends TCU     3. Cleared for discharge by consultants (if involved): No     Per night shift RN, pt. Has child she may be able to live with to help her.  Waiting for orthopedic consult.  Right CMS intact     Discharge Planner Nurse   Safe discharge environment identified: Yes  Barriers to discharge: No       Entered by: Diogenes Boland 12/08/2019   Please review provider order for any additional goals.   Nurse to notify provider when observation goals have been met and patient is ready for discharge.

## 2019-12-08 NOTE — H&P
Admitted:     12/07/2019      CHIEF COMPLAINT:  Ankle pain after a fall.  The patient found to have an ankle fracture.      HISTORY OF PRESENT ILLNESS:  Ms. Amador is a 69-year-old female who presented to the [facility] for concerns about ankle pain.  The patient slipped on ice and fell earlier this evening.  She twisted her right ankle inwards.  There was no head trauma and no loss of consciousness.  She denies pain or injury elsewhere besides her right ankle.  The patient was seen at [facility], and they took imaging, results showing ankle fracture.  She was seen in the [facility] ER for evaluation after x-ray confirmed ankle fracture.      On arrival to the ER, vital signs included blood pressure 146/80 with heart rate of 95, afebrile, saturation 95% on room air.  Workup there included imaging after reduction of the ankle.  Imaging demonstrated a displaced fracture of the medial malleolus and distal fibula and a small bone fragment adjacent to the posterior malleolus.      I spoke with Abdiel Kim of the Emergency Department, and plan is for admission to the hospital overnight for observation.  Attempts were made to mobilize the patient on crutches, but this was unsuccessful, and therefore she is being admitted to the hospital.  We will plan to have Orthopedics see the patient prior to discharge, although suspect likely will be discharged with splint and surgery in the near future.      PAST MEDICAL HISTORY:   1.  History of coronary artery disease with previous cardiac stent 4 years ago.   2.  History of myocardial infarction.   3.  Hyperlipidemia.   4.  Diabetes mellitus.      CURRENT MEDICATIONS:  Await Pharmacy reconciliation medication list.      ALLERGIES:  NONE.      FAMILY HISTORY:  Reviewed.  Nothing contributory to this admission.      SOCIAL HISTORY:  The patient drinks alcohol socially.  Does not smoke.      REVIEW OF SYSTEMS:  See HPI for  details.  Comprehensive greater than 10-point review of systems is otherwise negative besides that detailed above.      PHYSICAL EXAMINATION:   VITAL SIGNS:  Blood pressure is currently 150/75 with heart rate of 73.  Afebrile.  Saturation 94% on room air.   GENERAL:  The patient appears nontoxic and in no acute distress.  She appears comfortable.  A splint is in place in the right ankle, which is wrapped with an Ace bandage.   HEENT:  Head is atraumatic.  Sclerae are white.  Eyelids are normal.  Conjunctivae are normal.  Extraocular movements are intact.   NECK:  Supple.  No cervical or supraclavicular lymphadenopathy.   HEART:  Regular rate and rhythm.  No significant murmurs.  No lower extremity edema.   LUNGS:  Clear to auscultation bilaterally.  No intercostal retractions.  No conversational dyspnea.   ABDOMEN:  Nontender, nondistended.  Soft.  No masses.  No organomegaly.   EXTREMITIES:  Show no edema.   SKIN:  Reveals no rashes.  No jaundice.  Skin is dry to touch.  I was unable to examine the right ankle skin, as it is wrapped in a dressing and a splint in place.   NEUROLOGIC:  Cranial nerves II-XII appear to be intact.  Moves all extremities appropriately.  Sensation intact to light touch in the upper and lower extremities bilaterally, including the right ankle.  She is able to move the toes of the right ankle, and right ankle and toes are warm to touch.   PSYCHIATRIC:  Awake, alert and oriented x 3.  Mood and affect are normal and appropriate.      LABORATORY AND IMAGING DATA:  Reviewed above in HPI.  No labs were done this evening.      IMPRESSION AND PLAN:  Ms. Amador is a 69-year-old female with a history of coronary artery disease, hyperlipidemia, diabetes mellitus, who presents to the hospital this evening for concerns about right ankle pain after a mechanical fall.  The patient was diagnosed with a right ankle fracture.  This was reduced in the Emergency Department.  Attempts were made to discharge the  patient from the Emergency Department to home with crutches; however, the patient was unable to ambulate with crutches, and therefore the patient will be admitted to observation.   1.  Right ankle fracture:  Status post reduction in the Emergency Department.  Her pain is now much improved.  Splint in place.  Unable to ambulate with crutches in the Emergency Department.   2.  Diabetes mellitus history.   3.  Hyperlipidemia history.   4.  History of coronary artery disease with previous stent placement 4 years ago.  She reports no concerning cardiac symptoms since that time.      PLAN:   1.  Admit to observation.   2.  Orthopedic consultation.   3.  Pain control as needed.   4.  We will keep n.p.o. after midnight in case Orthopedics decides to do operative intervention.  However, I suspect likely the patient will be discharged with followup for a surgery with Orthopedics in the near future.   5.  Physical Therapy consultation to assist patient with immobilization with crutches, as she has struggled with this in the ER this evening.   6.  We will order basic morning labs in case Surgery is recommended this admission.  EKG was not performed, which would need to be done prior to surgery for operative clearance.  This has not been ordered.         SANTY COLBY MD             D: 2019   T: 2019   MT: GAUTAM      Name:     TRICE ELLIS   MRN:      [id number]        Account:      [id number]   :      1950        Admitted:     2019                   Document: [id number]       cc: Joanie Lemos MD

## 2019-12-08 NOTE — PLAN OF CARE
PT: Patient seen by physical therapy for evaluation and treatment.  Patient with PMH including CAD with previous cardiac stent 4 years ago, DM, hyperlipidemia, hx of MI.  Patient admitted with ankle fracture following mechanical fall.  Patient is independent with mobility at baseline.  Patient lives alone in a split level townhouse, 15 stairs to access main floor (bed/bath/kitchen) from garage.  Patient has family in the area; all family have at least 3 steps to enter home, and work during the day.      Discharge Planner PT   Patient plan for discharge: unknown.  Reports that she may end up staying with her son who has a rambler, 3 steps to enter home, works full time.  Current status: Patient supine upon arrival, reports increased ankle pain with mobility.  Agreeable to trial of mobility.  Education provided regarding NWB at left LE.  Patient transferred to sitting at EOB with min A and cueing.  Patient transferred to standing with 2WW with min A and verbal cueing for safe technique.  Patient with increased pain in standing.  Patient attempted gait with walker; able to take 2 steps with significant increase in pain, also limited by decreased upper body strength to support body weight during gait.  Barriers to return to prior living situation: stairs, pain, lives alone, decreased strength, fall risk  Recommendations for discharge: TCU  Rationale for recommendations: Patient presents significantly below baseline for functional mobility secondary to NWB at left LE. Patient is unable to ambulate with walker, unable to negotiate stairs into home.  Patient would benefit from ongoing physical therapy at TCU in order to increase independence with mobility.       Entered by: Francesca Ramirez 12/08/2019 11:52 AM

## 2019-12-09 ENCOUNTER — APPOINTMENT (OUTPATIENT)
Dept: GENERAL RADIOLOGY | Facility: CLINIC | Age: 69
DRG: 494 | End: 2019-12-09
Attending: INTERNAL MEDICINE
Payer: COMMERCIAL

## 2019-12-09 ENCOUNTER — ANESTHESIA (OUTPATIENT)
Dept: SURGERY | Facility: CLINIC | Age: 69
DRG: 494 | End: 2019-12-09
Payer: COMMERCIAL

## 2019-12-09 PROBLEM — S82.843A ANKLE FRACTURE, BIMALLEOLAR, CLOSED: Status: ACTIVE | Noted: 2019-12-09

## 2019-12-09 LAB
ANION GAP SERPL CALCULATED.3IONS-SCNC: 9 MMOL/L (ref 3–14)
BUN SERPL-MCNC: 11 MG/DL (ref 7–30)
CALCIUM SERPL-MCNC: 8.9 MG/DL (ref 8.5–10.1)
CHLORIDE SERPL-SCNC: 106 MMOL/L (ref 94–109)
CO2 SERPL-SCNC: 23 MMOL/L (ref 20–32)
CREAT SERPL-MCNC: 0.61 MG/DL (ref 0.52–1.04)
CREAT SERPL-MCNC: 0.71 MG/DL (ref 0.52–1.04)
GFR SERPL CREATININE-BSD FRML MDRD: 86 ML/MIN/{1.73_M2}
GFR SERPL CREATININE-BSD FRML MDRD: >90 ML/MIN/{1.73_M2}
GLUCOSE BLDC GLUCOMTR-MCNC: 127 MG/DL (ref 70–99)
GLUCOSE BLDC GLUCOMTR-MCNC: 161 MG/DL (ref 70–99)
GLUCOSE BLDC GLUCOMTR-MCNC: 162 MG/DL (ref 70–99)
GLUCOSE BLDC GLUCOMTR-MCNC: 184 MG/DL (ref 70–99)
GLUCOSE SERPL-MCNC: 166 MG/DL (ref 70–99)
HBA1C MFR BLD: 7 % (ref 0–5.6)
HGB BLD-MCNC: 11.6 G/DL (ref 11.7–15.7)
HGB BLD-MCNC: 9.2 G/DL (ref 11.7–15.7)
PLATELET # BLD AUTO: 149 10E9/L (ref 150–450)
POTASSIUM SERPL-SCNC: 3.8 MMOL/L (ref 3.4–5.3)
SODIUM SERPL-SCNC: 138 MMOL/L (ref 133–144)

## 2019-12-09 PROCEDURE — 25000128 H RX IP 250 OP 636: Performed by: INTERNAL MEDICINE

## 2019-12-09 PROCEDURE — 0QSG04Z REPOSITION RIGHT TIBIA WITH INTERNAL FIXATION DEVICE, OPEN APPROACH: ICD-10-PCS | Performed by: ORTHOPAEDIC SURGERY

## 2019-12-09 PROCEDURE — 25000125 ZZHC RX 250: Performed by: NURSE ANESTHETIST, CERTIFIED REGISTERED

## 2019-12-09 PROCEDURE — 96376 TX/PRO/DX INJ SAME DRUG ADON: CPT

## 2019-12-09 PROCEDURE — 85049 AUTOMATED PLATELET COUNT: CPT | Performed by: ORTHOPAEDIC SURGERY

## 2019-12-09 PROCEDURE — 36415 COLL VENOUS BLD VENIPUNCTURE: CPT | Performed by: PHYSICIAN ASSISTANT

## 2019-12-09 PROCEDURE — 25800030 ZZH RX IP 258 OP 636: Performed by: PHYSICIAN ASSISTANT

## 2019-12-09 PROCEDURE — 25000132 ZZH RX MED GY IP 250 OP 250 PS 637: Performed by: ORTHOPAEDIC SURGERY

## 2019-12-09 PROCEDURE — 0QSJ04Z REPOSITION RIGHT FIBULA WITH INTERNAL FIXATION DEVICE, OPEN APPROACH: ICD-10-PCS | Performed by: ORTHOPAEDIC SURGERY

## 2019-12-09 PROCEDURE — 27210794 ZZH OR GENERAL SUPPLY STERILE: Performed by: ORTHOPAEDIC SURGERY

## 2019-12-09 PROCEDURE — 93010 ELECTROCARDIOGRAM REPORT: CPT | Performed by: INTERNAL MEDICINE

## 2019-12-09 PROCEDURE — 99232 SBSQ HOSP IP/OBS MODERATE 35: CPT | Performed by: PHYSICIAN ASSISTANT

## 2019-12-09 PROCEDURE — 37000008 ZZH ANESTHESIA TECHNICAL FEE, 1ST 30 MIN: Performed by: ORTHOPAEDIC SURGERY

## 2019-12-09 PROCEDURE — 36000065 ZZH SURGERY LEVEL 4 W FLUORO 1ST 30 MIN: Performed by: ORTHOPAEDIC SURGERY

## 2019-12-09 PROCEDURE — 00000146 ZZHCL STATISTIC GLUCOSE BY METER IP

## 2019-12-09 PROCEDURE — 85018 HEMOGLOBIN: CPT | Performed by: PHYSICIAN ASSISTANT

## 2019-12-09 PROCEDURE — 40000277 XR SURGERY CARM FLUORO LESS THAN 5 MIN W STILLS: Mod: TC

## 2019-12-09 PROCEDURE — 36415 COLL VENOUS BLD VENIPUNCTURE: CPT | Performed by: ORTHOPAEDIC SURGERY

## 2019-12-09 PROCEDURE — 25800030 ZZH RX IP 258 OP 636: Performed by: ANESTHESIOLOGY

## 2019-12-09 PROCEDURE — C1713 ANCHOR/SCREW BN/BN,TIS/BN: HCPCS | Performed by: ORTHOPAEDIC SURGERY

## 2019-12-09 PROCEDURE — 40000306 ZZH STATISTIC PRE PROC ASSESS II: Performed by: ORTHOPAEDIC SURGERY

## 2019-12-09 PROCEDURE — 82565 ASSAY OF CREATININE: CPT | Performed by: ORTHOPAEDIC SURGERY

## 2019-12-09 PROCEDURE — 80048 BASIC METABOLIC PNL TOTAL CA: CPT | Performed by: PHYSICIAN ASSISTANT

## 2019-12-09 PROCEDURE — 25000125 ZZHC RX 250: Performed by: ORTHOPAEDIC SURGERY

## 2019-12-09 PROCEDURE — 71000012 ZZH RECOVERY PHASE 1 LEVEL 1 FIRST HR: Performed by: ORTHOPAEDIC SURGERY

## 2019-12-09 PROCEDURE — 99207 ZZC CDG-CHARGE REQUIRED MANUAL ENTRY: CPT | Performed by: PHYSICIAN ASSISTANT

## 2019-12-09 PROCEDURE — 12000011 ZZH R&B MS OVERFLOW

## 2019-12-09 PROCEDURE — 25000128 H RX IP 250 OP 636: Performed by: ANESTHESIOLOGY

## 2019-12-09 PROCEDURE — 36000063 ZZH SURGERY LEVEL 4 EA 15 ADDTL MIN: Performed by: ORTHOPAEDIC SURGERY

## 2019-12-09 PROCEDURE — 25000128 H RX IP 250 OP 636: Performed by: ORTHOPAEDIC SURGERY

## 2019-12-09 PROCEDURE — 37000009 ZZH ANESTHESIA TECHNICAL FEE, EACH ADDTL 15 MIN: Performed by: ORTHOPAEDIC SURGERY

## 2019-12-09 PROCEDURE — G0378 HOSPITAL OBSERVATION PER HR: HCPCS

## 2019-12-09 PROCEDURE — 25000128 H RX IP 250 OP 636: Performed by: NURSE ANESTHETIST, CERTIFIED REGISTERED

## 2019-12-09 DEVICE — IMPLANTABLE DEVICE: Type: IMPLANTABLE DEVICE | Site: ANKLE | Status: FUNCTIONAL

## 2019-12-09 DEVICE — IMP SCR SYN CORTEX 3.5X016MM FINE SS 204.016: Type: IMPLANTABLE DEVICE | Site: ANKLE | Status: FUNCTIONAL

## 2019-12-09 DEVICE — IMP SCR SYN LCP DIST 2.7X14MM SELF TAP SS 202.214: Type: IMPLANTABLE DEVICE | Site: ANKLE | Status: FUNCTIONAL

## 2019-12-09 DEVICE — IMP SCR SYN LCP DIST 2.7X12MM SELF TAP SS 202.212: Type: IMPLANTABLE DEVICE | Site: ANKLE | Status: FUNCTIONAL

## 2019-12-09 DEVICE — IMP SCR SYN CANC 4.0X50MM PART THRD SS 207.050: Type: IMPLANTABLE DEVICE | Site: ANKLE | Status: FUNCTIONAL

## 2019-12-09 RX ORDER — LIDOCAINE HYDROCHLORIDE 10 MG/ML
INJECTION, SOLUTION INFILTRATION; PERINEURAL PRN
Status: DISCONTINUED | OUTPATIENT
Start: 2019-12-09 | End: 2019-12-09

## 2019-12-09 RX ORDER — PHENYLEPHRINE HYDROCHLORIDE 10 MG/ML
INJECTION INTRAVENOUS PRN
Status: DISCONTINUED | OUTPATIENT
Start: 2019-12-09 | End: 2019-12-09

## 2019-12-09 RX ORDER — ACETAMINOPHEN 325 MG/1
650 TABLET ORAL EVERY 4 HOURS PRN
Status: DISCONTINUED | OUTPATIENT
Start: 2019-12-12 | End: 2019-12-11 | Stop reason: HOSPADM

## 2019-12-09 RX ORDER — DEXAMETHASONE SODIUM PHOSPHATE 4 MG/ML
INJECTION, SOLUTION INTRA-ARTICULAR; INTRALESIONAL; INTRAMUSCULAR; INTRAVENOUS; SOFT TISSUE PRN
Status: DISCONTINUED | OUTPATIENT
Start: 2019-12-09 | End: 2019-12-09

## 2019-12-09 RX ORDER — ONDANSETRON 2 MG/ML
4 INJECTION INTRAMUSCULAR; INTRAVENOUS EVERY 6 HOURS PRN
Status: DISCONTINUED | OUTPATIENT
Start: 2019-12-09 | End: 2019-12-11 | Stop reason: HOSPADM

## 2019-12-09 RX ORDER — CEFAZOLIN SODIUM 2 G/100ML
2 INJECTION, SOLUTION INTRAVENOUS
Status: COMPLETED | OUTPATIENT
Start: 2019-12-09 | End: 2019-12-09

## 2019-12-09 RX ORDER — FENTANYL CITRATE 50 UG/ML
INJECTION, SOLUTION INTRAMUSCULAR; INTRAVENOUS PRN
Status: DISCONTINUED | OUTPATIENT
Start: 2019-12-09 | End: 2019-12-09

## 2019-12-09 RX ORDER — HYDRALAZINE HYDROCHLORIDE 20 MG/ML
2.5-5 INJECTION INTRAMUSCULAR; INTRAVENOUS EVERY 10 MIN PRN
Status: DISCONTINUED | OUTPATIENT
Start: 2019-12-09 | End: 2019-12-09 | Stop reason: HOSPADM

## 2019-12-09 RX ORDER — HYDROMORPHONE HYDROCHLORIDE 1 MG/ML
.3-.5 INJECTION, SOLUTION INTRAMUSCULAR; INTRAVENOUS; SUBCUTANEOUS EVERY 5 MIN PRN
Status: DISCONTINUED | OUTPATIENT
Start: 2019-12-09 | End: 2019-12-09 | Stop reason: HOSPADM

## 2019-12-09 RX ORDER — NICOTINE POLACRILEX 4 MG
15-30 LOZENGE BUCCAL
Status: DISCONTINUED | OUTPATIENT
Start: 2019-12-09 | End: 2019-12-11 | Stop reason: HOSPADM

## 2019-12-09 RX ORDER — ACETAMINOPHEN 325 MG/1
975 TABLET ORAL EVERY 8 HOURS
Status: DISCONTINUED | OUTPATIENT
Start: 2019-12-09 | End: 2019-12-11 | Stop reason: HOSPADM

## 2019-12-09 RX ORDER — LIDOCAINE 40 MG/G
CREAM TOPICAL
Status: DISCONTINUED | OUTPATIENT
Start: 2019-12-09 | End: 2019-12-09 | Stop reason: HOSPADM

## 2019-12-09 RX ORDER — LIDOCAINE 40 MG/G
CREAM TOPICAL
Status: DISCONTINUED | OUTPATIENT
Start: 2019-12-09 | End: 2019-12-11 | Stop reason: HOSPADM

## 2019-12-09 RX ORDER — LABETALOL 20 MG/4 ML (5 MG/ML) INTRAVENOUS SYRINGE
10
Status: DISCONTINUED | OUTPATIENT
Start: 2019-12-09 | End: 2019-12-09 | Stop reason: HOSPADM

## 2019-12-09 RX ORDER — SODIUM CHLORIDE, SODIUM LACTATE, POTASSIUM CHLORIDE, CALCIUM CHLORIDE 600; 310; 30; 20 MG/100ML; MG/100ML; MG/100ML; MG/100ML
INJECTION, SOLUTION INTRAVENOUS CONTINUOUS
Status: DISCONTINUED | OUTPATIENT
Start: 2019-12-09 | End: 2019-12-09 | Stop reason: HOSPADM

## 2019-12-09 RX ORDER — SODIUM CHLORIDE 9 MG/ML
INJECTION, SOLUTION INTRAVENOUS CONTINUOUS
Status: DISCONTINUED | OUTPATIENT
Start: 2019-12-09 | End: 2019-12-10

## 2019-12-09 RX ORDER — DIAZEPAM 10 MG/2ML
2.5 INJECTION, SOLUTION INTRAMUSCULAR; INTRAVENOUS
Status: DISCONTINUED | OUTPATIENT
Start: 2019-12-09 | End: 2019-12-09 | Stop reason: HOSPADM

## 2019-12-09 RX ORDER — CEFAZOLIN SODIUM 2 G/100ML
2 INJECTION, SOLUTION INTRAVENOUS EVERY 8 HOURS
Status: COMPLETED | OUTPATIENT
Start: 2019-12-09 | End: 2019-12-10

## 2019-12-09 RX ORDER — NALOXONE HYDROCHLORIDE 0.4 MG/ML
.1-.4 INJECTION, SOLUTION INTRAMUSCULAR; INTRAVENOUS; SUBCUTANEOUS
Status: ACTIVE | OUTPATIENT
Start: 2019-12-09 | End: 2019-12-10

## 2019-12-09 RX ORDER — DEXTROSE MONOHYDRATE 25 G/50ML
25-50 INJECTION, SOLUTION INTRAVENOUS
Status: DISCONTINUED | OUTPATIENT
Start: 2019-12-09 | End: 2019-12-11 | Stop reason: HOSPADM

## 2019-12-09 RX ORDER — DIMENHYDRINATE 50 MG/ML
25 INJECTION, SOLUTION INTRAMUSCULAR; INTRAVENOUS
Status: DISCONTINUED | OUTPATIENT
Start: 2019-12-09 | End: 2019-12-09 | Stop reason: HOSPADM

## 2019-12-09 RX ORDER — AMOXICILLIN 250 MG
2 CAPSULE ORAL 2 TIMES DAILY
Status: DISCONTINUED | OUTPATIENT
Start: 2019-12-09 | End: 2019-12-11 | Stop reason: HOSPADM

## 2019-12-09 RX ORDER — NALOXONE HYDROCHLORIDE 0.4 MG/ML
.1-.4 INJECTION, SOLUTION INTRAMUSCULAR; INTRAVENOUS; SUBCUTANEOUS
Status: DISCONTINUED | OUTPATIENT
Start: 2019-12-09 | End: 2019-12-11 | Stop reason: HOSPADM

## 2019-12-09 RX ORDER — MAGNESIUM HYDROXIDE 1200 MG/15ML
LIQUID ORAL PRN
Status: DISCONTINUED | OUTPATIENT
Start: 2019-12-09 | End: 2019-12-09 | Stop reason: HOSPADM

## 2019-12-09 RX ORDER — ONDANSETRON 4 MG/1
4 TABLET, ORALLY DISINTEGRATING ORAL EVERY 30 MIN PRN
Status: DISCONTINUED | OUTPATIENT
Start: 2019-12-09 | End: 2019-12-09 | Stop reason: HOSPADM

## 2019-12-09 RX ORDER — KETAMINE HYDROCHLORIDE 10 MG/ML
INJECTION INTRAMUSCULAR; INTRAVENOUS PRN
Status: DISCONTINUED | OUTPATIENT
Start: 2019-12-09 | End: 2019-12-09

## 2019-12-09 RX ORDER — EPHEDRINE SULFATE 50 MG/ML
INJECTION, SOLUTION INTRAVENOUS PRN
Status: DISCONTINUED | OUTPATIENT
Start: 2019-12-09 | End: 2019-12-09

## 2019-12-09 RX ORDER — AMOXICILLIN 250 MG
1 CAPSULE ORAL 2 TIMES DAILY
Status: DISCONTINUED | OUTPATIENT
Start: 2019-12-09 | End: 2019-12-11 | Stop reason: HOSPADM

## 2019-12-09 RX ORDER — ALBUTEROL SULFATE 0.83 MG/ML
2.5 SOLUTION RESPIRATORY (INHALATION) EVERY 4 HOURS PRN
Status: DISCONTINUED | OUTPATIENT
Start: 2019-12-09 | End: 2019-12-09 | Stop reason: HOSPADM

## 2019-12-09 RX ORDER — NEOSTIGMINE METHYLSULFATE 1 MG/ML
VIAL (ML) INJECTION PRN
Status: DISCONTINUED | OUTPATIENT
Start: 2019-12-09 | End: 2019-12-09

## 2019-12-09 RX ORDER — GABAPENTIN 100 MG/1
100 CAPSULE ORAL 3 TIMES DAILY
Status: DISCONTINUED | OUTPATIENT
Start: 2019-12-09 | End: 2019-12-11 | Stop reason: HOSPADM

## 2019-12-09 RX ORDER — BUPIVACAINE HYDROCHLORIDE 5 MG/ML
INJECTION, SOLUTION PERINEURAL PRN
Status: DISCONTINUED | OUTPATIENT
Start: 2019-12-09 | End: 2019-12-09 | Stop reason: HOSPADM

## 2019-12-09 RX ORDER — KETOROLAC TROMETHAMINE 30 MG/ML
INJECTION, SOLUTION INTRAMUSCULAR; INTRAVENOUS PRN
Status: DISCONTINUED | OUTPATIENT
Start: 2019-12-09 | End: 2019-12-09

## 2019-12-09 RX ORDER — PROPOFOL 10 MG/ML
INJECTION, EMULSION INTRAVENOUS PRN
Status: DISCONTINUED | OUTPATIENT
Start: 2019-12-09 | End: 2019-12-09

## 2019-12-09 RX ORDER — OXYCODONE HYDROCHLORIDE 5 MG/1
5-10 TABLET ORAL EVERY 4 HOURS PRN
Status: DISCONTINUED | OUTPATIENT
Start: 2019-12-09 | End: 2019-12-11 | Stop reason: HOSPADM

## 2019-12-09 RX ORDER — ONDANSETRON 2 MG/ML
4 INJECTION INTRAMUSCULAR; INTRAVENOUS EVERY 30 MIN PRN
Status: DISCONTINUED | OUTPATIENT
Start: 2019-12-09 | End: 2019-12-09 | Stop reason: HOSPADM

## 2019-12-09 RX ORDER — FENTANYL CITRATE 50 UG/ML
25-50 INJECTION, SOLUTION INTRAMUSCULAR; INTRAVENOUS
Status: DISCONTINUED | OUTPATIENT
Start: 2019-12-09 | End: 2019-12-09 | Stop reason: HOSPADM

## 2019-12-09 RX ORDER — GLYCOPYRROLATE 0.2 MG/ML
INJECTION, SOLUTION INTRAMUSCULAR; INTRAVENOUS PRN
Status: DISCONTINUED | OUTPATIENT
Start: 2019-12-09 | End: 2019-12-09

## 2019-12-09 RX ORDER — HYDROMORPHONE HYDROCHLORIDE 1 MG/ML
0.5 INJECTION, SOLUTION INTRAMUSCULAR; INTRAVENOUS; SUBCUTANEOUS
Status: COMPLETED | OUTPATIENT
Start: 2019-12-09 | End: 2019-12-09

## 2019-12-09 RX ORDER — MEPERIDINE HYDROCHLORIDE 50 MG/ML
12.5 INJECTION INTRAMUSCULAR; INTRAVENOUS; SUBCUTANEOUS EVERY 5 MIN PRN
Status: DISCONTINUED | OUTPATIENT
Start: 2019-12-09 | End: 2019-12-09 | Stop reason: HOSPADM

## 2019-12-09 RX ORDER — HYDROMORPHONE HYDROCHLORIDE 1 MG/ML
.3-.5 INJECTION, SOLUTION INTRAMUSCULAR; INTRAVENOUS; SUBCUTANEOUS
Status: DISCONTINUED | OUTPATIENT
Start: 2019-12-09 | End: 2019-12-11 | Stop reason: HOSPADM

## 2019-12-09 RX ORDER — KETOROLAC TROMETHAMINE 15 MG/ML
15 INJECTION, SOLUTION INTRAMUSCULAR; INTRAVENOUS EVERY 6 HOURS
Status: COMPLETED | OUTPATIENT
Start: 2019-12-09 | End: 2019-12-10

## 2019-12-09 RX ORDER — ONDANSETRON 4 MG/1
4 TABLET, ORALLY DISINTEGRATING ORAL EVERY 6 HOURS PRN
Status: DISCONTINUED | OUTPATIENT
Start: 2019-12-09 | End: 2019-12-11 | Stop reason: HOSPADM

## 2019-12-09 RX ORDER — CEFAZOLIN SODIUM 1 G/50ML
1 INJECTION, SOLUTION INTRAVENOUS SEE ADMIN INSTRUCTIONS
Status: DISCONTINUED | OUTPATIENT
Start: 2019-12-09 | End: 2019-12-09 | Stop reason: HOSPADM

## 2019-12-09 RX ADMIN — CEFAZOLIN SODIUM 2 G: 2 INJECTION, SOLUTION INTRAVENOUS at 16:33

## 2019-12-09 RX ADMIN — KETOROLAC TROMETHAMINE 15 MG: 15 INJECTION, SOLUTION INTRAMUSCULAR; INTRAVENOUS at 14:19

## 2019-12-09 RX ADMIN — ONDANSETRON 4 MG: 2 INJECTION INTRAMUSCULAR; INTRAVENOUS at 07:55

## 2019-12-09 RX ADMIN — SENNOSIDES AND DOCUSATE SODIUM 1 TABLET: 8.6; 5 TABLET ORAL at 19:37

## 2019-12-09 RX ADMIN — CEFAZOLIN SODIUM 2 G: 2 INJECTION, SOLUTION INTRAVENOUS at 07:41

## 2019-12-09 RX ADMIN — ACETAMINOPHEN 975 MG: 325 TABLET, FILM COATED ORAL at 12:32

## 2019-12-09 RX ADMIN — GLYCOPYRROLATE 0.2 MG: 0.2 INJECTION, SOLUTION INTRAMUSCULAR; INTRAVENOUS at 07:45

## 2019-12-09 RX ADMIN — EPHEDRINE SULFATE 5 MG: 50 INJECTION, SOLUTION INTRAVENOUS at 07:59

## 2019-12-09 RX ADMIN — Medication 4.5 MG: at 08:14

## 2019-12-09 RX ADMIN — HYDROMORPHONE HYDROCHLORIDE 0.3 MG: 1 INJECTION, SOLUTION INTRAMUSCULAR; INTRAVENOUS; SUBCUTANEOUS at 05:01

## 2019-12-09 RX ADMIN — MIDAZOLAM 2 MG: 1 INJECTION INTRAMUSCULAR; INTRAVENOUS at 07:41

## 2019-12-09 RX ADMIN — ACETAMINOPHEN 975 MG: 325 TABLET, FILM COATED ORAL at 21:20

## 2019-12-09 RX ADMIN — SODIUM CHLORIDE 1000 ML: 9 INJECTION, SOLUTION INTRAVENOUS at 20:26

## 2019-12-09 RX ADMIN — KETOROLAC TROMETHAMINE 30 MG: 30 INJECTION, SOLUTION INTRAMUSCULAR at 07:50

## 2019-12-09 RX ADMIN — KETOROLAC TROMETHAMINE 15 MG: 15 INJECTION, SOLUTION INTRAMUSCULAR; INTRAVENOUS at 20:27

## 2019-12-09 RX ADMIN — DEXAMETHASONE SODIUM PHOSPHATE 4 MG: 4 INJECTION, SOLUTION INTRA-ARTICULAR; INTRALESIONAL; INTRAMUSCULAR; INTRAVENOUS; SOFT TISSUE at 07:55

## 2019-12-09 RX ADMIN — Medication 30 MG: at 08:18

## 2019-12-09 RX ADMIN — EPHEDRINE SULFATE 5 MG: 50 INJECTION, SOLUTION INTRAVENOUS at 07:56

## 2019-12-09 RX ADMIN — SODIUM CHLORIDE, POTASSIUM CHLORIDE, SODIUM LACTATE AND CALCIUM CHLORIDE: 600; 310; 30; 20 INJECTION, SOLUTION INTRAVENOUS at 09:42

## 2019-12-09 RX ADMIN — LIDOCAINE HYDROCHLORIDE 50 MG: 10 INJECTION, SOLUTION INFILTRATION; PERINEURAL at 07:48

## 2019-12-09 RX ADMIN — SODIUM CHLORIDE, POTASSIUM CHLORIDE, SODIUM LACTATE AND CALCIUM CHLORIDE: 600; 310; 30; 20 INJECTION, SOLUTION INTRAVENOUS at 07:41

## 2019-12-09 RX ADMIN — SENNOSIDES AND DOCUSATE SODIUM 1 TABLET: 8.6; 5 TABLET ORAL at 11:25

## 2019-12-09 RX ADMIN — PHENYLEPHRINE HYDROCHLORIDE 150 MCG: 10 INJECTION INTRAVENOUS at 07:59

## 2019-12-09 RX ADMIN — ROCURONIUM BROMIDE 30 MG: 10 INJECTION INTRAVENOUS at 07:50

## 2019-12-09 RX ADMIN — GLYCOPYRROLATE 0.8 MG: 0.2 INJECTION, SOLUTION INTRAMUSCULAR; INTRAVENOUS at 08:13

## 2019-12-09 RX ADMIN — HYDROMORPHONE HYDROCHLORIDE 0.3 MG: 1 INJECTION, SOLUTION INTRAMUSCULAR; INTRAVENOUS; SUBCUTANEOUS at 00:20

## 2019-12-09 RX ADMIN — HYDROMORPHONE HYDROCHLORIDE 1 MG: 1 INJECTION, SOLUTION INTRAMUSCULAR; INTRAVENOUS; SUBCUTANEOUS at 07:57

## 2019-12-09 RX ADMIN — GABAPENTIN 100 MG: 100 CAPSULE ORAL at 19:37

## 2019-12-09 RX ADMIN — PROPOFOL 200 MG: 10 INJECTION, EMULSION INTRAVENOUS at 07:50

## 2019-12-09 RX ADMIN — GABAPENTIN 100 MG: 100 CAPSULE ORAL at 14:19

## 2019-12-09 RX ADMIN — FENTANYL CITRATE 100 MCG: 50 INJECTION, SOLUTION INTRAMUSCULAR; INTRAVENOUS at 07:45

## 2019-12-09 ASSESSMENT — ACTIVITIES OF DAILY LIVING (ADL)
RETIRED_EATING: 0-->INDEPENDENT
DRESS: 1-->ASSISTIVE EQUIPMENT

## 2019-12-09 NOTE — PROGRESS NOTES
[facility]    Hospitalist Progress Note  Name: Tegan Amador    MRN: [id number]  Provider:  Aida Fields PA-C  Date of Service: 12/09/2019    Assessment & Plan   Summary of Stay: Tegan Amador is a 69 year old female with PMH significant for CAD s/p ELDON to proximal LAD (08/2014), HTN, HLD, depression, DM2, and prior tobacco abuse who was admitted on 12/7/2019 after a mechanical fall resulting in a right ankle fracture.     #Displaced bimalleolar right ankle fracture s/p ORIF (12/9/19)  #S/p mechanical fall: patient slipped on ice and fell the evening of 12/7/19 where she twisted her ankle inwards. No LOC and did not hit her head. X-ray of right ankle showed displaced fracture of the medial malleolus and distal fibular and a small bone fragment adjacent to the posterior malleolus. Orthopedic surgery consulted and took patient for ORIF this morning.   - Orthopedics consulted and following   - ADAT post procedure   - Nonweight bearing with right LE  - Pain control with scheduled Tylenol, Toradol, Gabapentin, PRN Oxycodone, and IV Dilaudid available for severe pain    - PT consulted and should reassess tomorrow for discharge recommendations  - SW consult to assist with discharge planning     #DM2: HgbA1c of 7.0  - Continue to hold Metformin and Glimepiride until PO well tolerated  - Medium sliding scale insulin ordered     #CAD, HTN, HLD: s/p ELDON to proximal LAD in 08/2014, previously followed with Dr. Richardson of cardiology at [facility]. No current chest pain or shortness of breath. Blood pressure intermittently elevated, likely 2/2 increased pain.   - Continue PTA Lisinopril and Metoprolol with parameters   - Resume Atorvastatin at discharge     #Anemia: no prior history with stable Hgb of 11.6 prior to surgery   - Continue to follow Hgb    #Depression: continue PTA Sertraline      DVT Prophylaxis: Lovenox  Code Status: Full Code  Disposition: Expected discharge in 1-2 days, admit to inpatient      Interval History   Patient reports no pain in her ankle since surgery. She appears slightly somnolent from anesthesia still but answers questions appropriately. Denies nausea, vomiting, chest pain, or shortness of breath. She has no appetite at this time. Son at bedside during interview.     -Data reviewed today: I reviewed all new labs and imaging reports over the last 24 hours.    Physical Exam   Temp: 96.4  F (35.8  C) Temp src: Oral BP: 96/45 Pulse: 80 Heart Rate: 75 Resp: 12 SpO2: 94 % O2 Device: Nasal cannula Oxygen Delivery: 1 LPM  Vitals:    12/07/19 2225   Weight: 87.7 kg (193 lb 6.4 oz)     Vital Signs with Ranges  Temp:  [94.8  F (34.9  C)-98.7  F (37.1  C)] 96.4  F (35.8  C)  Pulse:  [63-82] 80  Heart Rate:  [70-84] 75  Resp:  [8-24] 12  BP: ()/(45-91) 96/45  SpO2:  [90 %-98 %] 94 %  No intake/output data recorded.    GEN:  Alert, oriented x 3, appears comfortable, NAD.  HEENT:  Normocephalic/atraumatic, no scleral icterus, no nasal discharge, mouth moist.  CV:  Regular rate and rhythm, no murmur or JVD.  S1 + S2 noted, no S3 or S4.  LUNGS:  Clear to auscultation bilaterally without rales/rhonchi/wheezing/retractions.  Symmetric chest rise on inhalation noted.  ABD:  Active bowel sounds, soft, non-tender/non-distended.  No rebound/guarding/rigidity.  EXT: Right ankle in ACE wrap with splint in place, able to wiggle toes, right ankle and toes warm to touch. No cyanosis.  No acute joint synovitis noted.  SKIN:  Dry to touch, no exanthems noted in the visualized areas.    Medications     sodium chloride         acetaminophen  975 mg Oral Q8H     ceFAZolin  2 g Intravenous Q8H     [START ON 12/10/2019] enoxaparin ANTICOAGULANT  40 mg Subcutaneous Q24H     gabapentin  100 mg Oral TID     ketorolac  15 mg Intravenous Q6H     lisinopril  5 mg Oral QPM     metoprolol succinate ER  25 mg Oral Daily     senna-docusate  1 tablet Oral BID    Or     senna-docusate  2 tablet Oral BID     sertraline  100 mg  Oral Daily     sodium chloride (PF)  3 mL Intracatheter Q8H     Data   Results for orders placed or performed during the hospital encounter of 12/07/19   Basic metabolic panel     Status: Abnormal   Result Value Ref Range    Sodium 140 133 - 144 mmol/L    Potassium 4.1 3.4 - 5.3 mmol/L    Chloride 108 94 - 109 mmol/L    Carbon Dioxide 26 20 - 32 mmol/L    Anion Gap 6 3 - 14 mmol/L    Glucose 132 (H) 70 - 99 mg/dL    Urea Nitrogen 21 7 - 30 mg/dL    Creatinine 0.78 0.52 - 1.04 mg/dL    GFR Estimate 76 >60 mL/min/[1.73_m2]    GFR Estimate If Black 88 >60 mL/min/[1.73_m2]    Calcium 8.5 8.5 - 10.1 mg/dL   CBC with platelets differential     Status: Abnormal   Result Value Ref Range    WBC 6.1 4.0 - 11.0 10e9/L    RBC Count 3.77 (L) 3.8 - 5.2 10e12/L    Hemoglobin 10.9 (L) 11.7 - 15.7 g/dL    Hematocrit 33.8 (L) 35.0 - 47.0 %    MCV 90 78 - 100 fl    MCH 28.9 26.5 - 33.0 pg    MCHC 32.2 31.5 - 36.5 g/dL    RDW 13.8 10.0 - 15.0 %    Platelet Count 194 150 - 450 10e9/L    Diff Method Automated Method     % Neutrophils 67.7 %    % Lymphocytes 21.6 %    % Monocytes 9.2 %    % Eosinophils 1.0 %    % Basophils 0.3 %    % Immature Granulocytes 0.2 %    Nucleated RBCs 0 0 /100    Absolute Neutrophil 4.1 1.6 - 8.3 10e9/L    Absolute Lymphocytes 1.3 0.8 - 5.3 10e9/L    Absolute Monocytes 0.6 0.0 - 1.3 10e9/L    Absolute Eosinophils 0.1 0.0 - 0.7 10e9/L    Absolute Basophils 0.0 0.0 - 0.2 10e9/L    Abs Immature Granulocytes 0.0 0 - 0.4 10e9/L    Absolute Nucleated RBC 0.0    INR     Status: None   Result Value Ref Range    INR 0.99 0.86 - 1.14   Glucose by meter     Status: Abnormal   Result Value Ref Range    Glucose 130 (H) 70 - 99 mg/dL   Glucose by meter     Status: Abnormal   Result Value Ref Range    Glucose 165 (H) 70 - 99 mg/dL   Basic metabolic panel     Status: Abnormal   Result Value Ref Range    Sodium 138 133 - 144 mmol/L    Potassium 3.8 3.4 - 5.3 mmol/L    Chloride 106 94 - 109 mmol/L    Carbon Dioxide 23 20 - 32  mmol/L    Anion Gap 9 3 - 14 mmol/L    Glucose 166 (H) 70 - 99 mg/dL    Urea Nitrogen 11 7 - 30 mg/dL    Creatinine 0.61 0.52 - 1.04 mg/dL    GFR Estimate >90 >60 mL/min/[1.73_m2]    GFR Estimate If Black >90 >60 mL/min/[1.73_m2]    Calcium 8.9 8.5 - 10.1 mg/dL   Hemoglobin     Status: Abnormal   Result Value Ref Range    Hemoglobin 11.6 (L) 11.7 - 15.7 g/dL   Hemoglobin A1c     Status: Abnormal   Result Value Ref Range    Hemoglobin A1C 7.0 (H) 0 - 5.6 %   Glucose by meter     Status: Abnormal   Result Value Ref Range    Glucose 162 (H) 70 - 99 mg/dL   Glucose by meter     Status: Abnormal   Result Value Ref Range    Glucose 161 (H) 70 - 99 mg/dL   EKG 12-lead, tracing only     Status: None (Preliminary result)   Result Value Ref Range    Interpretation ECG Click View Image link to view waveform and result    EKG 12-lead, tracing only     Status: None (Preliminary result)   Result Value Ref Range    Interpretation ECG Click View Image link to view waveform and result      Aida Fields PA-C

## 2019-12-09 NOTE — PLAN OF CARE
Vitals: /62  Pulse 80   Temp 96.1  F  Resp 12  SpO2 94%      Neuro: WNL; sleepy  Cardiac: WNL  Lungs: WNL  GI: WNL; hypo BS, + flatus, no nausea.   : DTV. Bladder scanned for 176 @ 14:30.   Ortho: RLE casted. Elevated on pillows. Ice in place over cast. CMS intact. Able to wiggle toes.   Pain: Denies. Scheduled Tylenol, gabapentin, Toradol given. PRN oxycodone and dilaudid available.   IV: SL - drinking well  Diet: Advanced to regular Mod Carb; BS checks with meals and bedtime (sliding scale insulin ordered; holding oral agents)  Activity: Has not been up yet. NWB to RLE.   Misc: Weaned to RA. BPs soft; PA aware. Will start Lovenox tomorrow.   Plan: PT to see tomorrow. CC following for possible TCU placement.        Patient refused capnography. Educated on purpose of capnography; patient continued to refuse. Did not tolerate nasal piece well.

## 2019-12-09 NOTE — PLAN OF CARE
PRIMARY DIAGNOSIS: ACUTE PAIN - right ankle fx  OUTPATIENT/OBSERVATION GOALS TO BE MET BEFORE DISCHARGE:     1. Pain Status: 10/10, given IV dilaudid and PO oxycodone.      2. Return to near baseline physical activity: No, stand and pivot to commode. PT recommending TCU      3. Cleared for discharge by consultants (if involved): No     To OR in AM for ORIF of R ankle      Discharge Planner Nurse   Safe discharge environment identified: Yes  Barriers to discharge: No       Entered by: Ben Busch RN   Please review provider order for any additional goals.   Nurse to notify provider when observation goals have been met and patient is ready for discharge    Vitals are Temp: 98  F (36.7  C) Temp src: Oral BP: 122/69 Pulse: 63 Heart Rate: 84 Resp: 16 SpO2: 93 %.  Patient is Alert and Oriented x4. They are 1 Assist with Gait Belt and Walker.  Pt is a Regular diet.  They are complaining of 10/10 pain in their right ankle.  Oxycodone given for pain.  Patient is Saline locked.

## 2019-12-09 NOTE — OP NOTE
Procedure Date: 12/09/2019      PREOPERATIVE DIAGNOSIS:  Displaced bimalleolar right ankle fracture.      POSTOPERATIVE DIAGNOSIS:  Displaced bimalleolar right ankle fracture.      PROCEDURE:  Open reduction and internal fixation of displaced bimalleolar right ankle fracture.      ANESTHESIA:  General.        SURGEON:  Mary Bhat MD.        FIRST ASSISTANT:  ISREAL To.      INDICATIONS:  This 69-year-old woman fell yesterday sustaining the above-described injury.  Appropriate risks and alternatives have been discussed at length, and it has been elected to proceed with surgical intervention.      DESCRIPTION OF PROCEDURE:  The patient was brought to the operating room, given a general anesthetic, right ankle prepped and draped sterilely in the usual manner.  Under tourniquet control, I opened medially and laterally.  Both fractures were exposed by subperiosteal dissection.  As expected, there was a fairly large medial malleolar fragment.  This was reduced anatomically and fixed with two 50 mm partially threaded cancellous screws.  On the lateral side was a Perry type B fracture.  This was reduced, held temporarily with a clamp.  I placed one interfragmentary screw and then a 3-hole distal fibular locking plate.  Final image intensification x-rays showed excellent anatomic restoration of the mortise with excellent position of all fixation.      Wounds were irrigated copiously with antibiotic solution.  Hemostasis was obtained by electrocautery.  Closure done in layers, closing the fascia with 0 Vicryl, subcutaneous tissue with 2-0 Vicryl, staples on the skin.  Sterile compressive dressing with plaster splints applied.  The patient was awakened and taken to the recovery room in good condition.  There were no intraoperative complications and minimal blood loss.         MARY BHAT MD             D: 12/09/2019   T: 12/09/2019   MT: WT      Name:     TRICE ELLIS   MRN:      [id number]         Account:        [id number]   :      1950           Procedure Date: 2019      Document: [id number]       cc: Terry Gupta MD

## 2019-12-09 NOTE — ANESTHESIA POSTPROCEDURE EVALUATION
Patient: Tegan Amador    Procedure(s):  OPEN REDUCTION INTERNAL FIXATION, FRACTURE, RIGHT ANKLE    Diagnosis:* No pre-op diagnosis entered *  Diagnosis Additional Information: No value filed.    Anesthesia Type:  General, LMA    Note:  Anesthesia Post Evaluation    Patient location during evaluation: PACU  Patient participation: Able to fully participate in evaluation  Level of consciousness: awake  Pain management: adequate  multimodal analgesia used between 6 hours prior to anesthesia start to PACU dischargeAirway patency: patent  Cardiovascular status: acceptable  Respiratory status: acceptable  Hydration status: acceptable  PONV: none             Last vitals:  Vitals:    12/09/19 0815 12/09/19 0845 12/09/19 0850   BP: 101/68 (!) 114/91    Pulse:  78 77   Resp:  10 11   Temp:  96.8  F (36  C)    SpO2:  98% 90%         Electronically Signed By: Mateusz Chavarria MD  December 9, 2019  9:32 AM

## 2019-12-09 NOTE — BRIEF OP NOTE
[facility]    Brief Operative Note    Pre-operative diagnosis: * No pre-op diagnosis entered *  Post-operative diagnosis Same as pre-operative diagnosis    Procedure: Procedure(s):  OPEN REDUCTION INTERNAL FIXATION, FRACTURE, RIGHT ANKLE  Surgeon: Surgeon(s) and Role:     * Terry Gupta MD - Primary     * Remington Quijano - Assisting  Anesthesia: General   Estimated blood loss: Minimal  Drains: None  Specimens: * No specimens in log *  Findings:   None.  Complications: None.  Implants:   Implant Name Type Inv. Item Serial No.  Lot No. LRB No. Used   2.7mm/3.5mm LCP Lateral Distal Fibula Plates 3 holes, right    SY[employer]ES [id number] Right 1   IMP SCR SYN LCP DIST 2.7X12MM SELF TAP .212 [employer] IMP SCR SYN LCP DIST 2.7X12MM SELF TAP .212  [employer]AT[employer] [id number] Right 3   IMP SCR SYN LCP DIST 2.7X14MM SELF TAP .214 [employer] IMP SCR SYN LCP DIST 2.7X14MM SELF TAP .214  [employer] 8003 [id number] Right 2   IMP SCR SYN CORTEX [id number] FINE .016 [employer] IMP SCR SYN CORTEX 3.7L693CE FINE .016  [employer] 335 [id number] Right 2   IMP SCR SYN CORTEX [id number] FINE .018 [employer] IMP SCR SYN CORTEX 3.0Y756OK FINE .018  [employer] 335 [id number] Right 1   IMP SCR SYN CORTEX 3.1G639LV FINE .020 [employer] IMP SCR SYN CORTEX 3.3P869HV FINE .020  [employer] 335 [id number] Right 1   IMP SCR SYN CANC 4.0X50MM PART THRD .050 [employer] IMP SCR SYN CANC 4.0X50MM PART THRD .050  [employer] 335 [id number] Right 2

## 2019-12-09 NOTE — ANESTHESIA CARE TRANSFER NOTE
Patient: Tegan Amador    Procedure(s):  OPEN REDUCTION INTERNAL FIXATION, FRACTURE, RIGHT ANKLE    Diagnosis: * No pre-op diagnosis entered *  Diagnosis Additional Information: No value filed.    Anesthesia Type:   General, LMA     Note:  Airway :Face Mask  Patient transferred to:PACU  Comments:   Spontaneous respirations, oral suctioned, bilateral eye opening and hand grasps.  Extubated to FM O2 6lpm.  VSS to PACU.Handoff Report: Identifed the Patient, Identified the Reponsible Provider, Reviewed the pertinent medical history, Discussed the surgical course, Reviewed Intra-OP anesthesia mangement and issues during anesthesia, Set expectations for post-procedure period and Allowed opportunity for questions and acknowledgement of understanding      Vitals: (Last set prior to Anesthesia Care Transfer)    CRNA VITALS  12/9/2019 0810 - 12/9/2019 0846      12/9/2019             Resp Rate (observed):  18                Electronically Signed By: CHRIS Samuel CRNA  December 9, 2019  8:46 AM

## 2019-12-09 NOTE — PLAN OF CARE
PRIMARY DIAGNOSIS: ACUTE PAIN - right ankle fx  OUTPATIENT/OBSERVATION GOALS TO BE MET BEFORE DISCHARGE:     1. Pain Status: 10/10, given IV dilaudid and PO oxycodone.      2. Return to near baseline physical activity: No, stand and pivot to commode. PT recommending TCU      3. Cleared for discharge by consultants (if involved): No     To OR in AM for ORIF of R ankle      Discharge Planner Nurse   Safe discharge environment identified: Yes  Barriers to discharge: No       Entered by: Ben Busch RN   Please review provider order for any additional goals.   Nurse to notify provider when observation goals have been met and patient is ready for discharge    Vitals are Temp: 98.7  F (37.1  C) Temp src: Oral BP: 116/53 Pulse: 63 Heart Rate: 77 Resp: 16 SpO2: 91 %.  Patient is Alert and Oriented x4. They are 1 Assist with Gait Belt and Walker.  Pt is a Regular diet.  They are complaining of 10/10 pain in their right ankle. Dilaudid given for pain.  Patient is Saline locked.

## 2019-12-09 NOTE — PLAN OF CARE
PRIMARY DIAGNOSIS: ACUTE PAIN - right ankle fx  OUTPATIENT/OBSERVATION GOALS TO BE MET BEFORE DISCHARGE:     1. Pain Status: 10/10, given IV dilaudid and PO oxycodone.      2. Return to near baseline physical activity: No, stand and pivot to commode. PT recommending TCU      3. Cleared for discharge by consultants (if involved): No     To OR in AM for ORIF of R ankle      Discharge Planner Nurse   Safe discharge environment identified: Yes  Barriers to discharge: No       Entered by: Ben Busch RN   Please review provider order for any additional goals.   Nurse to notify provider when observation goals have been met and patient is ready for discharge    Vitals are Temp: 97.8  F (36.6  C) Temp src: Oral BP: (!) 155/64 Pulse: 82 Heart Rate: 77 Resp: 16 SpO2: 91 %.  Patient is Alert and Oriented x4. They are 1 Assist with Gait Belt and Walker.  Pt is a Regular diet.  They are complaining of 9/10 pain in their right ankle. Dilaudid given for pain.  Patient is Saline locked.     Bathed pt with tika wipes. Pt urinated. Belongings left in room. EKG completed. Labs drawn. Pt sent to Pre-op for ORIF repair at 0540am.     Report given to Bradley LARIOS.

## 2019-12-09 NOTE — ANESTHESIA PREPROCEDURE EVALUATION
Anesthesia Pre-Procedure Evaluation    Patient: Tegan Amador   MRN: [id number] : 1950          Preoperative Diagnosis: * No pre-op diagnosis entered *    Procedure(s):  OPEN REDUCTION INTERNAL FIXATION, FRACTURE, RIGHT ANKLE    History reviewed. No pertinent past medical history.  History reviewed. No pertinent surgical history.  Anesthesia Evaluation     . Pt has had prior anesthetic. Type: General    No history of anesthetic complications          ROS/MED HX    ENT/Pulmonary:  - neg pulmonary ROS     Neurologic:  - neg neurologic ROS     Cardiovascular:     (+) Dyslipidemia, hypertension--CAD, --stent,2014 Drug Eluting Stent .. : . . . :. .       METS/Exercise Tolerance:     Hematologic:     (+) Anemia, -      Musculoskeletal:   (+) arthritis, fracture lower extremity: Ankle, -       GI/Hepatic:  - neg GI/hepatic ROS       Renal/Genitourinary:  - ROS Renal section negative       Endo: Comment: Class 2 obesity    (+) type II DM Obesity, .      Psychiatric:     (+) psychiatric history depression      Infectious Disease:  - neg infectious disease ROS       Malignancy:      - no malignancy   Other:    - neg other ROS                      Physical Exam  Normal systems: cardiovascular, pulmonary and dental    Airway   Mallampati: II  TM distance: >3 FB  Neck ROM: full    Dental     Cardiovascular   Rhythm and rate: regular and normal      Pulmonary    breath sounds clear to auscultation    Other findings: Lab Test        19                       0516          0558          WBC           --          6.1           HGB          11.6*        10.9*         MCV           --          90            PLT           --          194           INR           --          0.99           Lab Test        19                       0516          0558          NA           138          140           POTASSIUM    3.8          4.1           CHLORIDE     106          108           CO2           "23           26            BUN          11           21            CR           0.61         0.78          ANIONGAP     9            6             JEANNINE          8.9          8.5           GLC          166*         132*                        Lab Results   Component Value Date    WBC 6.1 12/08/2019    HGB 11.6 (L) 12/09/2019    HCT 33.8 (L) 12/08/2019     12/08/2019     12/09/2019    POTASSIUM 3.8 12/09/2019    CHLORIDE 106 12/09/2019    CO2 23 12/09/2019    BUN 11 12/09/2019    CR 0.61 12/09/2019     (H) 12/09/2019    JEANNINE 8.9 12/09/2019    INR 0.99 12/08/2019       Preop Vitals  BP Readings from Last 3 Encounters:   12/09/19 (!) 155/64    Pulse Readings from Last 3 Encounters:   12/09/19 82      Resp Readings from Last 3 Encounters:   12/09/19 16    SpO2 Readings from Last 3 Encounters:   12/09/19 91%      Temp Readings from Last 1 Encounters:   12/09/19 97.8  F (36.6  C) (Oral)    Ht Readings from Last 1 Encounters:   12/07/19 1.651 m (5' 5\")      Wt Readings from Last 1 Encounters:   12/07/19 87.7 kg (193 lb 6.4 oz)    Estimated body mass index is 32.18 kg/m  as calculated from the following:    Height as of this encounter: 1.651 m (5' 5\").    Weight as of this encounter: 87.7 kg (193 lb 6.4 oz).       Anesthesia Plan      History & Physical Review  History and physical reviewed and following examination; no interval change.    ASA Status:  3 .    NPO Status:  > 8 hours    Plan for General and LMA with Intravenous induction. Maintenance will be Balanced.    PONV prophylaxis:  Ondansetron (or other 5HT-3) and Dexamethasone or Solumedrol       Postoperative Care  Postoperative pain management:  IV analgesics, Oral pain medications and Multi-modal analgesia.      Consents  Anesthetic plan, risks, benefits and alternatives discussed with:  Patient.  Use of blood products discussed: No .   .                 Mateusz Chavarria MD                    .  "

## 2019-12-09 NOTE — PLAN OF CARE
PRIMARY DIAGNOSIS: ACUTE PAIN - right ankle fx  OUTPATIENT/OBSERVATION GOALS TO BE MET BEFORE DISCHARGE:     1. Pain Status: Pt. Reports her pain is 10/10 with movement, oxycodone 10 mg given, pt. Reports she is still 10/10 after oxycodone, 0.3 dilaudid given and pain improved to 6/10, Pt. Icing and elevating     2. Return to near baseline physical activity: No, Pt. Unable to move with crutches in ED, PT recommends TCU     3. Cleared for discharge by consultants (if involved): No     Physical therapy recommends TCU, pt. Has child she may be able to live with to help her. ORIF right ankle 0730 on 12/9/19.  Right CMS intact     Discharge Planner Nurse   Safe discharge environment identified: Yes  Barriers to discharge: No       Entered by: Diogenes Boland 12/08/2019   Please review provider order for any additional goals.   Nurse to notify provider when observation goals have been met and patient is ready for discharge

## 2019-12-10 ENCOUNTER — APPOINTMENT (OUTPATIENT)
Dept: PHYSICAL THERAPY | Facility: CLINIC | Age: 69
DRG: 494 | End: 2019-12-10
Payer: COMMERCIAL

## 2019-12-10 LAB
GLUCOSE BLDC GLUCOMTR-MCNC: 143 MG/DL (ref 70–99)
GLUCOSE BLDC GLUCOMTR-MCNC: 146 MG/DL (ref 70–99)
GLUCOSE BLDC GLUCOMTR-MCNC: 151 MG/DL (ref 70–99)
GLUCOSE BLDC GLUCOMTR-MCNC: 168 MG/DL (ref 70–99)
GLUCOSE BLDC GLUCOMTR-MCNC: 172 MG/DL (ref 70–99)
INTERPRETATION ECG - MUSE: NORMAL
INTERPRETATION ECG - MUSE: NORMAL

## 2019-12-10 PROCEDURE — 25000131 ZZH RX MED GY IP 250 OP 636 PS 637: Performed by: PHYSICIAN ASSISTANT

## 2019-12-10 PROCEDURE — 97116 GAIT TRAINING THERAPY: CPT | Mod: GP

## 2019-12-10 PROCEDURE — 25000132 ZZH RX MED GY IP 250 OP 250 PS 637: Performed by: PHYSICIAN ASSISTANT

## 2019-12-10 PROCEDURE — 00000146 ZZHCL STATISTIC GLUCOSE BY METER IP

## 2019-12-10 PROCEDURE — 25000132 ZZH RX MED GY IP 250 OP 250 PS 637: Performed by: ORTHOPAEDIC SURGERY

## 2019-12-10 PROCEDURE — 97530 THERAPEUTIC ACTIVITIES: CPT | Mod: GP

## 2019-12-10 PROCEDURE — 97164 PT RE-EVAL EST PLAN CARE: CPT | Mod: GP

## 2019-12-10 PROCEDURE — 99232 SBSQ HOSP IP/OBS MODERATE 35: CPT | Performed by: PHYSICIAN ASSISTANT

## 2019-12-10 PROCEDURE — 40000894 ZZH STATISTIC OT IP EVAL DEFER

## 2019-12-10 PROCEDURE — 25000128 H RX IP 250 OP 636: Performed by: ORTHOPAEDIC SURGERY

## 2019-12-10 PROCEDURE — 12000011 ZZH R&B MS OVERFLOW

## 2019-12-10 RX ORDER — GLIMEPIRIDE 1 MG/1
1 TABLET ORAL EVERY EVENING
Status: DISCONTINUED | OUTPATIENT
Start: 2019-12-10 | End: 2019-12-11 | Stop reason: HOSPADM

## 2019-12-10 RX ORDER — ACETAMINOPHEN 325 MG/1
650 TABLET ORAL EVERY 6 HOURS PRN
Qty: 30 TABLET | Refills: 0 | Status: SHIPPED | OUTPATIENT
Start: 2019-12-10

## 2019-12-10 RX ORDER — METFORMIN HYDROCHLORIDE 750 MG/1
750 TABLET, EXTENDED RELEASE ORAL
Status: DISCONTINUED | OUTPATIENT
Start: 2019-12-10 | End: 2019-12-11 | Stop reason: HOSPADM

## 2019-12-10 RX ORDER — AMOXICILLIN 250 MG
1 CAPSULE ORAL 2 TIMES DAILY PRN
Qty: 20 TABLET | Refills: 0 | Status: SHIPPED | OUTPATIENT
Start: 2019-12-10

## 2019-12-10 RX ORDER — ONDANSETRON 4 MG/1
4 TABLET, ORALLY DISINTEGRATING ORAL EVERY 6 HOURS PRN
Qty: 6 TABLET | Refills: 0 | Status: SHIPPED | OUTPATIENT
Start: 2019-12-10

## 2019-12-10 RX ADMIN — KETOROLAC TROMETHAMINE 15 MG: 15 INJECTION, SOLUTION INTRAMUSCULAR; INTRAVENOUS at 08:57

## 2019-12-10 RX ADMIN — SERTRALINE HYDROCHLORIDE 100 MG: 100 TABLET ORAL at 09:17

## 2019-12-10 RX ADMIN — KETOROLAC TROMETHAMINE 15 MG: 15 INJECTION, SOLUTION INTRAMUSCULAR; INTRAVENOUS at 02:33

## 2019-12-10 RX ADMIN — INSULIN ASPART 1 UNITS: 100 INJECTION, SOLUTION INTRAVENOUS; SUBCUTANEOUS at 10:48

## 2019-12-10 RX ADMIN — ACETAMINOPHEN 975 MG: 325 TABLET, FILM COATED ORAL at 19:50

## 2019-12-10 RX ADMIN — GABAPENTIN 100 MG: 100 CAPSULE ORAL at 19:50

## 2019-12-10 RX ADMIN — METOPROLOL SUCCINATE 25 MG: 25 TABLET, EXTENDED RELEASE ORAL at 09:17

## 2019-12-10 RX ADMIN — ACETAMINOPHEN 975 MG: 325 TABLET, FILM COATED ORAL at 04:36

## 2019-12-10 RX ADMIN — OXYCODONE HYDROCHLORIDE 5 MG: 5 TABLET ORAL at 19:50

## 2019-12-10 RX ADMIN — INSULIN ASPART 1 UNITS: 100 INJECTION, SOLUTION INTRAVENOUS; SUBCUTANEOUS at 13:40

## 2019-12-10 RX ADMIN — LISINOPRIL 5 MG: 5 TABLET ORAL at 19:50

## 2019-12-10 RX ADMIN — METFORMIN ER 750 MG 750 MG: 750 TABLET ORAL at 15:32

## 2019-12-10 RX ADMIN — SENNOSIDES AND DOCUSATE SODIUM 2 TABLET: 8.6; 5 TABLET ORAL at 19:50

## 2019-12-10 RX ADMIN — GLIMEPIRIDE 1 MG: 1 TABLET ORAL at 19:50

## 2019-12-10 RX ADMIN — GABAPENTIN 100 MG: 100 CAPSULE ORAL at 13:47

## 2019-12-10 RX ADMIN — GABAPENTIN 100 MG: 100 CAPSULE ORAL at 09:17

## 2019-12-10 RX ADMIN — ENOXAPARIN SODIUM 40 MG: 40 INJECTION SUBCUTANEOUS at 09:24

## 2019-12-10 RX ADMIN — CEFAZOLIN SODIUM 2 G: 2 INJECTION, SOLUTION INTRAVENOUS at 00:56

## 2019-12-10 RX ADMIN — ACETAMINOPHEN 975 MG: 325 TABLET, FILM COATED ORAL at 12:48

## 2019-12-10 RX ADMIN — SENNOSIDES AND DOCUSATE SODIUM 1 TABLET: 8.6; 5 TABLET ORAL at 09:17

## 2019-12-10 RX ADMIN — OXYCODONE HYDROCHLORIDE 5 MG: 5 TABLET ORAL at 12:48

## 2019-12-10 NOTE — CONSULTS
Care Transition Initial Assessment -      Met with: Patient and Family    Active Problems:    Ankle fracture    Ankle fracture, bimalleolar, closed       DATA  Lives With: alone   Living Arrangements: house  Quality of Family Relationships: helpful, involved, supportive  Description of Support System: Supportive, Involved  Who is your support system?: Children  Support Assessment: Adequate family and caregiver support, Adequate social supports.   Identified issues/concerns regarding health management: admitted due to fall and ankle fracture.       Quality of Family Relationships: helpful, involved, supportive  Transportation Anticipated: family or friend will provide    ASSESSMENT  Cognitive Status:  awake, alert and oriented  Concerns to be addressed: Spoke with pt and son Liam about pts current living situation and discharge planning. Pt lives in a split level town house. The pt stated she does not use medical devices. Pt feels safe and is independent at home. Has 2 sons and friends in the area. Pt drives herself. After TCU, pt has no concerns with returning to her home, but plans to stay with son Liam (who lives in [place]) after TCU until she feels completely comfortable returning home. Liam has a one floor house with only a few stairs to get into the home.      PLAN  Financial costs for the patient were not discussed.   Patient given options and choices for discharge yes .  Patient/family is agreeable to the plan?  Yes:   Transportation/person available to transport on day of discharge  is Roby Sam and have they been notified/set up   Patient Goals and Preferences: TCU  Patient anticipates discharging to:  TCU    Roby Wheeler gave TCU preferences which was okay with pt. Referrals were sent to the following facilities.   1. [facility]  2. [facility]  3. [facility]  4. [facility]  5. [facility].    Update sonLiam when known where pt is accepted at. Roby Sam to  transport. Call Sam to set up a time, and if he does not answer call roby Wheeler.     SW will continue to be available as needed until discharge.    Addendum: Pt's son Liam and facility was notified of a 1300 discharge. Roby Vargas will be transporting, Liam notified Sam of time and facility.     Funmi Marion, [facility] [place]  [phone]

## 2019-12-10 NOTE — PROGRESS NOTES
Orthopedic Surgery  Tegan Amador  12/10/2019  Admit Date:  2019  POD: 1 Day Post-Op   Procedure(s):  Open reduction and internal fixation of displaced bimalleolar right ankle fracture    Alert and orient to person, place, and time.  Patient resting comfortably in bed.    Pain increasing at time of exam. Pain meds requested  Tolerating oral intake.    Denies nausea or vomiting  Denies chest pain or shortness of breath    Vital Sign Ranges  Temperature Temp  Av.7  F (36.5  C)  Min: 96.1  F (35.6  C)  Max: 98.8  F (37.1  C)   Blood pressure Systolic (24hrs), Av , Min:85 , Max:152        Diastolic (24hrs), Av, Min:40, Max:79      Pulse Pulse  Av  Min: 90  Max: 90   Respirations Resp  Avg: 15  Min: 12  Max: 16   Pulse oximetry SpO2  Av.7 %  Min: 93 %  Max: 97 %       Splint is clean, dry, and intact.   Minimal erythema of the surrounding skin.   Bilateral calves are soft, non-tender.  Right lower extremity is NVI.  Sensation intact bilateral lower extremities  +Dp pulse    Labs:  Recent Labs   Lab Test 19  0516 19  0558   POTASSIUM 3.8 4.1     Recent Labs   Lab Test 19  2151 19  0516 19  0558   HGB 9.2* 11.6* 10.9*     Recent Labs   Lab Test 19  0558   INR 0.99     Recent Labs   Lab Test 19  1036 19  0558   * 194       1. PLAN:   Continue Lovenox x 14 days for DVT prophylaxis.     Mobilize with PT/OT    Non-WB Right LE.     Continue current pain regiment.   Elevate on foam wedge when at rest   Dressings: Keep intact.    Follow-up: 2 weeks Dr Gupta    2. Disposition   Anticipate d/c to TCU when medically cleared and progressing in PT.  Ok from ortho standpoint.     Sherie Davis PA-C

## 2019-12-10 NOTE — PLAN OF CARE
"./72 (BP Location: Right arm)   Pulse 90   Temp 98.6  F (37  C) (Oral)   Resp 16   Ht 1.651 m (5' 5\")   Wt 87.7 kg (193 lb 6.4 oz)   SpO2 93%   BMI 32.18 kg/m    Orientation: A&Ox4  VS: BP stable through out the night  LS: clear  Cardiac: WNL  GI: active  : voiding to BSC  Diet: tolerating regular diet   Skin: Dressing- R leg CDI, CMS intact  Activity: Ax1, NWB R ankle   Pain: denies  Lines/IV: PIV   Plan: PT/OT   Dispo: pending therapy, home with fam vs TCU Placement     "

## 2019-12-10 NOTE — PLAN OF CARE
PRIMARY DIAGNOSIS: Right Ankle Fracture   OUTPATIENT/OBSERVATION GOALS TO BE MET BEFORE DISCHARGE:  1. Pain Status: Improved but still requiring IV narcotics, scheduled Toradol.     2. Return to near baseline physical activity: No     3. Cleared for discharge by consultants (if involved): No     Discharge Planner Nurse   Safe discharge environment identified: No  Barriers to discharge: Yes       Entered by: Jes Desai 12/10/2019 4:08 PM  Please review provider order for any additional goals.   Nurse to notify provider when observation goals have been met and patient is ready for discharge.     VSS, on RA, afebrile. Up Ax2 with walker and gait belt, pivots to bedside commode will sometimes hop into the bathroom using the walker. NWB on right lower leg. Son at bedside and supportive. Scheduled Tylenol and prn oxy given for pain this afternoon, with relief. A&O. Mod carb diet, good appetite. Denies nausea. Saline locked. PT worked with pt today- see  note. Plan- TCU placement, pain control.

## 2019-12-10 NOTE — PLAN OF CARE
PT eval and treat attempted, per discussion with RN, pt not appropriate for PT today d/t high pain levels. Will re-assess tomorrow.

## 2019-12-10 NOTE — PLAN OF CARE
Orientation: A&Ox4  VS: BP 85/41 & 90/40- bolus given, recheck 113/60  LS: clear  Cardiac: WNL  GI: active  : voiding   Diet: tolerating regular diet   Skin: Dressing- R leg CDI, CMS intact   Activity: Ax1, NWB R ankle   Pain: denies  Lines/IV: PIV   Plan: PT/OT

## 2019-12-10 NOTE — PROGRESS NOTES
[facility]    Hospitalist Progress Note      Assessment & Plan   Tegan Amador is a 69 year old female with PMH significant for CAD s/p ELDON to proximal LAD (08/2014), HTN, HLD, depression, DM2, and prior tobacco abuse who was admitted on 12/7/2019 after a mechanical fall resulting in a right ankle fracture.      Displaced bimalleolar right ankle fracture s/p ORIF (12/9/19)  S/p mechanical fall: patient slipped on ice and fell the evening of 12/7/19 where she twisted her ankle inwards. No LOC and did not hit her head. X-ray of right ankle showed displaced fracture of the medial malleolus and distal fibular and a small bone fragment adjacent to the posterior malleolus. Orthopedic surgery consulted and took patient for ORIF 12/9 without complications.  Per orthopedics Continue Lovenox x 14 days for DVT prophylaxis, Mobilize with PT/OT, Non-WB Right LE, Elevate on foam wedge when at rest   -Follow-up: 2 weeks Dr Gupta  - Pain control with scheduled Tylenol, Toradol, Gabapentin, PRN Oxycodone, and IV Dilaudid available for severe pain    - PT consulted and should reassess tomorrow for discharge recommendations  - SW consult to assist with discharge planning, TCU anticipated     #DM2: HgbA1c of 7.0  - resume Metformin and Glimepiride no that PO well tolerated  - Medium sliding scale insulin ordered      #CAD, HTN, HLD: s/p ELDON to proximal LAD in 08/2014, previously followed with Dr. Richardson of [facility]ology [facility]. No current chest pain or shortness of breath. Blood pressure intermittently elevated, likely 2/2 increased pain.   - Continue PTA Lisinopril and Metoprolol with parameters   - Resume Atorvastatin at discharge      #Anemia: no prior history with stable Hgb of 11.6 prior to surgery   Hemoglobin stable, 9.2.     #Depression: continue PTA Sertraline         DVT Prophylaxis: Enoxaparin (Lovenox) subcutaneous per orthopedics  Code Status: Full Code  Expected discharge: Tomorrow, recommended to  transitional care unit once safe disposition plan/ TCU bed available.    Ariane Vasquez PA-C  Text Page (7am - 6pm, M-F)    Interval History   Patients pain is controlled. Hemodynamically stable. Blood pressures softer after surgery, improved after IV fluids. Tolerating normal diet. No chest pain, shortness of breath. No fevers, chills. Voiding without difficulty to bedside commode. CMS intact.   Son present for interview. No acute complaints.   Comfortable appearing.     -Data reviewed today: I reviewed all new labs and imaging results over the last 24 hours.  Physical Exam   Temp: 96.8  F (36  C) Temp src: Oral BP: (!) 148/79 Pulse: 90 Heart Rate: 79 Resp: 16 SpO2: 94 % O2 Device: None (Room air)    Vitals:    12/07/19 2225   Weight: 87.7 kg (193 lb 6.4 oz)     Vital Signs with Ranges  Temp:  [96.8  F (36  C)-98.8  F (37.1  C)] 96.8  F (36  C)  Pulse:  [90] 90  Heart Rate:  [71-91] 79  Resp:  [14-16] 16  BP: ()/(40-79) 148/79  SpO2:  [93 %-97 %] 94 %  I/O last 3 completed shifts:  In: 1003 [I.V.:1003]  Out: -       Constitutional:Awake, alert, no apparent distress  Respiratory:  Normal work of breathing. Lungs clear to auscultation bilaterally, no crackles or wheezing.  Cardiovascular: Regular rate and rhythm, normal S1 and S2, and no murmur appreciated.   GI: Normal bowel sounds, soft, non-distended, non-tender.  Musculoskeletal: Sensation, capillary refill intact of lower extremities bilaterally. Right ankle splint in place. Calves non tender.   Skin/Integument: No rashes, no cyanosis, no peripheral edema.  Neuro: Moving all extremities with normal strength. Coordination and sensation grossly intact. Speech clear. No focal deficits.   Psych: Appropriate affect.            Medications       acetaminophen  975 mg Oral Q8H     enoxaparin ANTICOAGULANT  40 mg Subcutaneous Q24H     gabapentin  100 mg Oral TID     glimepiride  1 mg Oral QPM     insulin aspart  1-7 Units Subcutaneous TID AC     insulin  aspart  1-5 Units Subcutaneous At Bedtime     lisinopril  5 mg Oral QPM     metFORMIN  750 mg Oral Daily with breakfast     metoprolol succinate ER  25 mg Oral Daily     senna-docusate  1 tablet Oral BID    Or     senna-docusate  2 tablet Oral BID     sertraline  100 mg Oral Daily     sodium chloride (PF)  3 mL Intracatheter Q8H       Data   Recent Labs   Lab 12/09/19  2151 12/09/19  1036 12/09/19  0516 12/08/19  0558   WBC  --   --   --  6.1   HGB 9.2*  --  11.6* 10.9*   MCV  --   --   --  90   PLT  --  149*  --  194   INR  --   --   --  0.99   NA  --   --  138 140   POTASSIUM  --   --  3.8 4.1   CHLORIDE  --   --  106 108   CO2  --   --  23 26   BUN  --   --  11 21   CR  --  0.71 0.61 0.78   ANIONGAP  --   --  9 6   JEANNINE  --   --  8.9 8.5   GLC  --   --  166* 132*       No results found for this or any previous visit (from the past 24 hour(s)).  Ariane Vasquez PA-C on 12/10/2019 at 2:51 PM

## 2019-12-10 NOTE — PLAN OF CARE
PRIMARY DIAGNOSIS: Right Ankle Fracture   OUTPATIENT/OBSERVATION GOALS TO BE MET BEFORE DISCHARGE:  1. Pain Status: Improved but still requiring IV narcotics.    2. Return to near baseline physical activity: No    3. Cleared for discharge by consultants (if involved): No    Discharge Planner Nurse   Safe discharge environment identified: No  Barriers to discharge: Yes       Entered by: Jes Desai 12/10/2019 4:08 PM     Please review provider order for any additional goals.   Nurse to notify provider when observation goals have been met and patient is ready for discharge.    VSS, on RA, afebrile. Up Ax2 with walker and gait belt, pivots to bedside commode will sometimes hop into the bathroom using the walker. NWB on right lower leg. Son at bedside and supportive. Scheduled Toradol given with relief. A&O. Mod carb diet, good appetite. Denies nausea. Saline locked. Plan- work with PT, placement, pain control.

## 2019-12-10 NOTE — PLAN OF CARE
Discharge Planner OT   Patient plan for discharge: TCU  Current status: OT order received, chart reviewed. Per chart review and discussion with PT, pt requiring increased assist for all transfers/mobility and ADL tasks currently, limited by NWB status and pain. Pt pursuing TCU at discharge. Will defer OT eval to next level of cafre.   Barriers to return to prior living situation: Refer to PT  Recommendations for discharge: Defer to PT  Rationale for recommendations: Pt currently requiring increased assist and is below baseline level of functioning. Defer to OT eval to next level of care.        Entered by: Lashell Flowers 12/10/2019 1:05 PM

## 2019-12-10 NOTE — PLAN OF CARE
PT orders received, evaluation completed, treatment initiated. Patient with PMH including CAD with previous cardiac stent 4 years ago, DM, hyperlipidemia, hx of MI.  Patient admitted with ankle fracture following mechanical fall.   Now seen POD#1 S/p ORIF at R ankle. Patient independent prior to injury, lives in [facility].  Needs to do 15 steps to access bed/bath/kitchen from garage. Pt plans to live with son in [facility] home with just three steps to enter with no railing.       Discharge Planner PT   Patient plan for discharge: TCU  Current status: Pt supine upon arrival, agreeable to PT. Educated about PT role and POC. Transferred supine<>sit with SBA, increased time and effort needed. Transfers sit<>stand with knee scooter, CGA, and VC/VSC for form and technique, increased time and effort noted, moderate pain throughout. VC for positioning of R knee on knee scooter. Ambulates 125' with knee scooter, CGA, multiple standing rest breaks. Pt reporting moderate pain throughout, very fatigued, rests elbows on handles of scooter at times d/t fatigue. VC for form, technique, and safety. Educated about safe stair handling with visual demonstration using crutches and using scoot up backwards method, pt and son demonstrated good understanding but not wanting to assess at this time d/t weakness, pain, and fear of falling. Pt does not think she will be able to do stairs at this time, discussed TCU options.Transferred from knee scooter to WC with CGA, pivoting on L LE. Transferred WC to bed with CGA. Transferred sit<>supine with SBA. Supine end of session with all needs in reach and R LE elevated. Time spent discussing TCU options, pt and son demonstrated good understanding. Social work and RN aware.  Barriers to return to prior living situation: stairs to enter, lives alone  Recommendations for discharge: TCU  Rationale for recommendations: Pt presenting below baseline for functional mobility. Pt currently  requiring CGA and multiple rest breaks for ambulation with knee scooter and is unable to complete stairs d/t weakness, pain, decreased balance, and decreased activity tolerance. Pt would benefit from continued IP PT and PT in TCU setting to increase strength, balance, and endurance.       Entered by: Latasha Rapp 12/10/2019 11:42 AM

## 2019-12-11 VITALS
OXYGEN SATURATION: 95 % | DIASTOLIC BLOOD PRESSURE: 71 MMHG | HEART RATE: 90 BPM | RESPIRATION RATE: 17 BRPM | SYSTOLIC BLOOD PRESSURE: 141 MMHG | TEMPERATURE: 97.8 F | HEIGHT: 65 IN | BODY MASS INDEX: 32.22 KG/M2 | WEIGHT: 193.4 LBS

## 2019-12-11 LAB
GLUCOSE BLDC GLUCOMTR-MCNC: 130 MG/DL (ref 70–99)
GLUCOSE BLDC GLUCOMTR-MCNC: 145 MG/DL (ref 70–99)

## 2019-12-11 PROCEDURE — 99207 ZZC CDG-CODE CATEGORY CHANGED: CPT | Performed by: PHYSICIAN ASSISTANT

## 2019-12-11 PROCEDURE — 25000128 H RX IP 250 OP 636: Performed by: ORTHOPAEDIC SURGERY

## 2019-12-11 PROCEDURE — 25000132 ZZH RX MED GY IP 250 OP 250 PS 637: Performed by: PHYSICIAN ASSISTANT

## 2019-12-11 PROCEDURE — 99239 HOSP IP/OBS DSCHRG MGMT >30: CPT | Performed by: PHYSICIAN ASSISTANT

## 2019-12-11 PROCEDURE — 00000146 ZZHCL STATISTIC GLUCOSE BY METER IP

## 2019-12-11 PROCEDURE — 25000132 ZZH RX MED GY IP 250 OP 250 PS 637: Performed by: ORTHOPAEDIC SURGERY

## 2019-12-11 RX ADMIN — SERTRALINE HYDROCHLORIDE 100 MG: 100 TABLET ORAL at 08:26

## 2019-12-11 RX ADMIN — METFORMIN ER 750 MG 750 MG: 750 TABLET ORAL at 08:25

## 2019-12-11 RX ADMIN — OXYCODONE HYDROCHLORIDE 10 MG: 5 TABLET ORAL at 11:42

## 2019-12-11 RX ADMIN — ENOXAPARIN SODIUM 40 MG: 40 INJECTION SUBCUTANEOUS at 08:25

## 2019-12-11 RX ADMIN — SENNOSIDES AND DOCUSATE SODIUM 2 TABLET: 8.6; 5 TABLET ORAL at 08:25

## 2019-12-11 RX ADMIN — ACETAMINOPHEN 975 MG: 325 TABLET, FILM COATED ORAL at 08:25

## 2019-12-11 RX ADMIN — METOPROLOL SUCCINATE 25 MG: 25 TABLET, EXTENDED RELEASE ORAL at 08:26

## 2019-12-11 RX ADMIN — GABAPENTIN 100 MG: 100 CAPSULE ORAL at 08:26

## 2019-12-11 RX ADMIN — OXYCODONE HYDROCHLORIDE 5 MG: 5 TABLET ORAL at 08:26

## 2019-12-11 NOTE — PLAN OF CARE
Patient's After Visit Summary was reviewed with patient and/or family.   Patient verbalized understanding of After Visit Summary, recommended follow up and was given an opportunity to ask questions.   Discharge medications sent home with patient/family: Prescriptions sent to TCU    Discharged with son          OBSERVATION patient END time: 1:31 PM

## 2019-12-11 NOTE — PLAN OF CARE
VSS. Pain minimal overnight, declines interventions. Up with assist x1 pivot to commode. NWB to RLE. Elevating with wedge. PT following, plan for pt to discharge to TCU. SW sending referrals. Will continue to monitor.     Temp: 98  F (36.7  C) Temp src: Oral BP: 124/57   Heart Rate: 60 Resp: 18 SpO2: 98 % O2 Device: None (Room air)

## 2019-12-11 NOTE — PLAN OF CARE
"BP (!) 141/71 (BP Location: Right arm)   Pulse 90   Temp 97.8  F (36.6  C) (Oral)   Resp 17   Ht 1.651 m (5' 5\")   Wt 87.7 kg (193 lb 6.4 oz)   SpO2 95%   BMI 32.18 kg/m      Neuro: WNL   Cardiac: WNL   Lungs: WL   GI: Ex. Constipation. Stool meds given.   : WNL   Pain: Managed with oral medications.   IV: SL   Meds: Tolerating PO meds.   Labs/tests: See flowsheets.   Diet: Mod Carb diet.   Activity: Up Assist of 1 with Pivot to commode.     Plan: Discharged to TCU.     Will continue to monitor and provide cares.    "

## 2019-12-11 NOTE — PROGRESS NOTES
Your information has been submitted on December 11th, 2019 at 11:30:47 AM [facility]. The confirmation number is [id number]    Shirlene So  Care Management Coordinator  [facility]  [phone]

## 2019-12-11 NOTE — PLAN OF CARE
Physical Therapy Discharge Summary    Reason for therapy discharge:    Discharged to transitional care facility.    Progress towards therapy goal(s). See goals on Care Plan in [facility] electronic health record for goal details.  Goals not met.  Barriers to achieving goals:   discharge from facility.    Therapy recommendation(s):    Continued therapy is recommended.  Rationale/Recommendations:  Patient would benefit from ongoing physical therapy at TCU in order to increase strength, activity tolerance and independence with mobility.

## 2019-12-11 NOTE — PLAN OF CARE
Neuro: alert and oriented   Cardiac: WNL  Lungs: WNL  GI: last BM 12/8, stool softener given.   : WNL-   Skin: WNL  Pain: Patient having hard time controlling pain. Patient denies pain until pain is 10/10 then difficult bringing pain level down. Patient educated on pain scale and how to stay on top of pain.   Diet: tolerating regular  Activity: pivot transfer to bedside commode. Has crutches in room.    Misc:   Plan: TCU 12/11 possibly with son to transport.

## 2019-12-12 NOTE — DISCHARGE SUMMARY
[facility]    Discharge Summary  Hospitalist    Date of Admission:  12/7/2019  Date of Discharge:  12/11/2019  1:12 PM  Discharging Provider: Ariane Vasquez PA-C  Date of Service (when I saw the patient): 12/11/19    Discharge Diagnoses   Fall  Right ankle fracture  Hypertension  Type 2 Diabetes Mellitus    History of Present Illness   Tegan Amador is a 69 year old female with PMH significant for CAD s/p ELDON to proximal LAD (08/2014), HTN, HLD, depression, DM2, and prior tobacco abuse who was admitted on 12/7/2019 after a mechanical fall resulting in a right ankle fracture. There were no concerns for head trauma or LOC. She initially presented to an Urgent care with these complaints.   She was seen in consultation by Orthopedics who offered ORIF for repair of her displaced bimalleolar fracture.   She was admitted to observation for further monitoring following surgical management.    Hospital Course   Tegan Amador was admitted on 12/7/2019.  The following problems were addressed during her hospitalization:    Displaced bimalleolar right ankle fracture s/p ORIF (12/9/19)  S/p mechanical fall: patient slipped on ice and fell the evening of 12/7/19 where she twisted her ankle inwards. No LOC and did not hit her head. X-ray of right ankle showed displaced fracture of the medial malleolus and distal fibular and a small bone fragment adjacent to the posterior malleolus. Orthopedic surgery consulted and took patient for ORIF 12/9 without complications.  Per orthopedics Continue Lovenox x 14 days for DVT prophylaxis, Mobilize with PT/OT, Non-WB Right LE, Elevate on foam wedge when at rest   -Follow-up: 2 weeks Dr Gupta  - Pain control with scheduled Tylenol, Toradol, Gabapentin, PRN Oxycodone available for severe pain    -Recommend bowel regimen including senna, miralax to prevent constipation  - PT consulted recommending TCU for rehabilitation, SW assisted in coordination of  "disposition     #DM2: HgbA1c of 7.0  - resume Metformin and Glimepiride      #CAD, HTN, HLD: s/p LEDON to proximal LAD in 08/2014, previously followed with Dr. Richardson of cardiology at [facility]. Blood pressure intermittently elevated, likely 2/2 increased pain.   - Continue PTA Lisinopril and Metoprolol   - Resume Atorvastatin      #Anemia: no prior history with stable Hgb of 11.6 prior to surgery   Hemoglobin stable, 9.2.     #Depression: continue PTA Sertraline           Ariane Vasquez PA-C        Pending Results     None.    Code Status   Full Code       Primary Care Physician   Joanie Lemos    INTERVAL HISTORY  Patient reports intermittent pain in her ankle since surgery. Alert and interactive, visiting with family at bedside. Denies nausea, vomiting, chest pain, or shortness of breath. Tolerating normal diet. Reports passing flatus, bowel meds given.     BP (!) 141/71 (BP Location: Right arm)   Pulse 90   Temp 97.8  F (36.6  C) (Oral)   Resp 17   Ht 1.651 m (5' 5\")   Wt 87.7 kg (193 lb 6.4 oz)   SpO2 95%   BMI 32.18 kg/m      Constitutional: Awake, alert, no apparent distress  Respiratory:  Normal work of breathing. Lungs clear to auscultation bilaterally, no crackles or wheezing.  Cardiovascular: Regular rate and rhythm, normal S1 and S2, and no murmur appreciated.   GI: Bowel sounds present. soft, non-distended, non-tender.   Skin/Integument/MK: Warm, dry. no peripheral edema. No calf pain.   Neuro: No focal deficits. Moving all extremities with normal strength. Coordination and sensation grossly intact. Speech clear. No focal deficits.   Psych: Appropriate affect.        Discharge Disposition   Discharged to rehabilitation facility  Condition at discharge: Stable    Consultations This Hospital Stay   ORTHOPEDIC SURGERY IP CONSULT  PHYSICAL THERAPY ADULT IP CONSULT  SOCIAL WORK IP CONSULT  OCCUPATIONAL THERAPY ADULT IP CONSULT  PHYSICAL THERAPY ADULT IP CONSULT  SOCIAL WORK IP CONSULT  PHYSICAL " THERAPY ADULT IP CONSULT  OCCUPATIONAL THERAPY ADULT IP CONSULT  PHARMACY DISCHARGE EDUCATION BY PHARMACIST    Time Spent on this Encounter   IAriane PA-C, personally saw the patient today and spent greater than 30 minutes discharging this patient.    Discharge Orders      General info for SNF    Length of Stay Estimate: Short Term Care: Estimated # of Days <30  Condition at Discharge: Improving  Level of care:skilled   Rehabilitation Potential: Good  Admission H&P remains valid and up-to-date: Yes  Recent Chemotherapy: N/A  Use Nursing Home Standing Orders: Yes     Mantoux instructions    Give two-step Mantoux (PPD) Per Facility Policy Yes     Reason for your hospital stay    Right ankle fracture:  ORIF     Wound care (specify)    Site:   Right ankle  Instructions:  Keep splint intact, change if >60% saturated or peeling off.  Keep incision and splint dry.     Follow Up and recommended labs and tests    Follow up with Dr. Gupta at [facility] 2 weeks post surgery.   Call [phone] for appointment and questions.     Activity - Up with assistive device    Up with walker     Weight bearing status    Non-Weight bearing Right LE     Additional Discharge Instructions    1. Apply ice over splint for 20 minutes every two hours as needed to decrease pain and swelling.   2. Keep elevated at all times when at rest above your heart. This helps control swelling.   3. Keep splint clean and dry until follow-up visit. Do not try to remove the splint. May cover with plastic bag to shower. Call our clinic immediately if the splint gets wet to have it changed and avoid skin damage. DO NOT insert anything in your splint to scratch or for any other reason.   4. Transition from your narcotic pain control to just tylenol as you are able. Limit total acetaminophen to less than 3grams or 3000mg per day. No driving on narcotic pain medicine like oxycodone.   5. Take stool softener to avoid constipation while taking narcotics,  and decrease or stop taking stool softeners if you develop diarrhea   6. Notify care team if persistent nausea, vomiting, or fevers >101.5.     General info for SNF    Length of Stay Estimate: Short Term Care: Estimated # of Days <30  Condition at Discharge: Stable  Level of care:skilled   Rehabilitation Potential: Good  Admission H&P remains valid and up-to-date: Yes  Recent Chemotherapy: N/A  Use Nursing Home Standing Orders: Yes     Mantoux instructions    Give two-step Mantoux (PPD) Per Facility Policy Yes     Physical Therapy Adult Consult    Evaluate and treat as clinically indicated.    Reason:  Right ankle ORIF     Occupational Therapy Adult Consult    Evaluate and treat as clinically indicated.    Reason:  Right ankle ORIF     Fall precautions     Advance Diet as Tolerated    Follow this diet upon discharge: Moderate consistent carbohydrate (6683-1973 yg / 4-6 CHO units per meal)     Discharge Medications   Discharge Medication List as of 12/11/2019 12:48 PM      START taking these medications    Details   acetaminophen (TYLENOL) 325 MG tablet Take 2 tablets (650 mg) by mouth every 6 hours as needed for other (multimodal surgical pain management along with NSAIDS and opioid medication as indicated based on pain control and physical function.), Disp-30 tablet, R-0, Local Print      enoxaparin ANTICOAGULANT (LOVENOX) 40 MG/0.4ML syringe Inject 0.4 mLs (40 mg) Subcutaneous every 24 hours for 14 days, Disp-5.6 mL, R-0, Local Print      ondansetron (ZOFRAN-ODT) 4 MG ODT tab Take 1 tablet (4 mg) by mouth every 6 hours as needed for nausea or vomiting, Disp-6 tablet, R-0, E-Prescribe      senna-docusate (SENOKOT-S/PERICOLACE) 8.6-50 MG tablet Take 1 tablet by mouth 2 times daily as needed for constipation, Disp-20 tablet, R-0, E-Prescribe         CONTINUE these medications which have NOT CHANGED    Details   aspirin 81 MG EC tablet Take 81 mg by mouth daily, Historical      glimepiride (AMARYL) 1 MG tablet Take 1  mg by mouth every evening, Historical      lisinopril (PRINIVIL/ZESTRIL) 5 MG tablet Take 5 mg by mouth every evening, Historical      metFORMIN (GLUCOPHAGE-XR) 750 MG 24 hr tablet Take 750 mg by mouth daily (with breakfast), Historical      metoprolol succinate ER (TOPROL-XL) 25 MG 24 hr tablet Take 25 mg by mouth daily, Historical      rosuvastatin (CRESTOR) 40 MG tablet Take 40 mg by mouth At Bedtime, Historical      sertraline (ZOLOFT) 100 MG tablet Take 100 mg by mouth daily, Historical         STOP taking these medications       HYDROcodone-acetaminophen (NORCO) 5-325 MG tablet Comments:   Reason for Stopping:             Allergies   No Known Allergies  Data   Results for orders placed or performed during the hospital encounter of 12/07/19   Ankle XR, G/E 3 views, right    Narrative    EXAM: XR ANKLE RT G/E 3 VW  LOCATION: [facility]  DATE/TIME: 12/7/2019 8:15 PM    INDICATION: Post reduction.  COMPARISON: 12/07/2019.      Impression    IMPRESSION: Displaced fracture of the medial malleolus and distal fibula with small bone fragment adjacent to the posterior malleolus. Increased lateral subluxation of the talus in relation to the distal tibia with increased ankle mortise asymmetry.   Improved alignment on the lateral view. Splint material.   XR Surgery JESIKA L/T 5 Min Fluoro w Stills    Narrative    This exam was marked as non-reportable because it will not be read by a   radiologist or a [place] non-radiologist provider.                   Ariane SMITH-SHARIF  [facility]

## 2019-12-16 ENCOUNTER — RECORDS - HEALTHEAST (OUTPATIENT)
Dept: LAB | Facility: CLINIC | Age: 69
End: 2019-12-16

## 2019-12-16 LAB
ANION GAP SERPL CALCULATED.3IONS-SCNC: 11 MMOL/L (ref 5–18)
BUN SERPL-MCNC: 10 MG/DL (ref 8–28)
CALCIUM SERPL-MCNC: 9.3 MG/DL (ref 8.5–10.5)
CHLORIDE BLD-SCNC: 104 MMOL/L (ref 98–107)
CO2 SERPL-SCNC: 24 MMOL/L (ref 22–31)
CREAT SERPL-MCNC: 0.7 MG/DL (ref 0.6–1.1)
GFR SERPL CREATININE-BSD FRML MDRD: >60 ML/MIN/1.73M2
GLUCOSE BLD-MCNC: 159 MG/DL (ref 70–125)
HGB BLD-MCNC: 11.5 G/DL (ref 12–16)
POTASSIUM BLD-SCNC: 3.5 MMOL/L (ref 3.5–5)
SODIUM SERPL-SCNC: 139 MMOL/L (ref 136–145)

## (undated) DEVICE — CAST PADDING 4" UNSTERILE 9044

## (undated) DEVICE — GLOVE PROTEXIS POWDER FREE 7.5 ORTHOPEDIC 2D73ET75

## (undated) DEVICE — LINEN HALF SHEET 5512

## (undated) DEVICE — PACK LOWER EXTREMITY RIDGES

## (undated) DEVICE — DRSG GAUZE 4X4" TRAY

## (undated) DEVICE — SU VICRYL 2-0 CT-2 27" UND J269H

## (undated) DEVICE — CAST BUCKET

## (undated) DEVICE — CAST PADDING 4" STERILE 9044S

## (undated) DEVICE — DRSG ADAPTIC 3X8" 6113

## (undated) DEVICE — SU VICRYL 0 CT-2 27" J334H

## (undated) DEVICE — DRSG KERLIX FLUFFS X5

## (undated) DEVICE — PREP SCRUB SOL EXIDINE 4% CHG 4OZ 29002-404

## (undated) DEVICE — SUCTION CANISTER MEDIVAC LINER 3000ML W/LID 65651-530

## (undated) DEVICE — BAG CLEAR TRASH 1.3M 39X33" P4040C

## (undated) DEVICE — ESU GROUND PAD ADULT W/CORD E7507

## (undated) DEVICE — Device

## (undated) DEVICE — LINEN FULL SHEET 5511

## (undated) DEVICE — GLOVE PROTEXIS BLUE W/NEU-THERA 7.5  2D73EB75

## (undated) DEVICE — DRSG ABDOMINAL 07 1/2X8" 7197D

## (undated) DEVICE — GLOVE PROTEXIS POWDER FREE 7.0 ORTHOPEDIC 2D73ET70

## (undated) DEVICE — SOL WATER IRRIG 1000ML BOTTLE 2F7114

## (undated) DEVICE — DRAPE C-ARM 60X42" 1013

## (undated) DEVICE — LINEN ORTHO ACL PACK 5447

## (undated) DEVICE — CAST FIBERGLASS 3" ROLL WHITE 73458-00002-00

## (undated) DEVICE — DRILL BIT QUICK COUPLING 2.5X110MM GOLD 310.25

## (undated) DEVICE — STPL SKIN 35W 6.9MM  PXW35

## (undated) DEVICE — GLOVE PROTEXIS POWDER FREE SMT 8.0  2D72PT80X

## (undated) DEVICE — PREP CHLORAPREP 26ML TINTED ORANGE  260815

## (undated) RX ORDER — LIDOCAINE HYDROCHLORIDE 10 MG/ML
INJECTION, SOLUTION EPIDURAL; INFILTRATION; INTRACAUDAL; PERINEURAL
Status: DISPENSED
Start: 2019-12-09

## (undated) RX ORDER — CEFAZOLIN SODIUM 2 G/100ML
INJECTION, SOLUTION INTRAVENOUS
Status: DISPENSED
Start: 2019-12-09

## (undated) RX ORDER — PROPOFOL 10 MG/ML
INJECTION, EMULSION INTRAVENOUS
Status: DISPENSED
Start: 2019-12-09

## (undated) RX ORDER — ONDANSETRON 2 MG/ML
INJECTION INTRAMUSCULAR; INTRAVENOUS
Status: DISPENSED
Start: 2019-12-09

## (undated) RX ORDER — KETAMINE HCL IN 0.9 % NACL 50 MG/5 ML
SYRINGE (ML) INTRAVENOUS
Status: DISPENSED
Start: 2019-12-09

## (undated) RX ORDER — GLYCOPYRROLATE 0.2 MG/ML
INJECTION INTRAMUSCULAR; INTRAVENOUS
Status: DISPENSED
Start: 2019-12-09

## (undated) RX ORDER — FENTANYL CITRATE 50 UG/ML
INJECTION, SOLUTION INTRAMUSCULAR; INTRAVENOUS
Status: DISPENSED
Start: 2019-12-09

## (undated) RX ORDER — DEXAMETHASONE SODIUM PHOSPHATE 4 MG/ML
INJECTION, SOLUTION INTRA-ARTICULAR; INTRALESIONAL; INTRAMUSCULAR; INTRAVENOUS; SOFT TISSUE
Status: DISPENSED
Start: 2019-12-09

## (undated) RX ORDER — NEOSTIGMINE METHYLSULFATE 1 MG/ML
VIAL (ML) INJECTION
Status: DISPENSED
Start: 2019-12-09

## (undated) RX ORDER — BUPIVACAINE HYDROCHLORIDE 5 MG/ML
INJECTION, SOLUTION EPIDURAL; INTRACAUDAL
Status: DISPENSED
Start: 2019-12-09